# Patient Record
Sex: FEMALE | Race: WHITE | NOT HISPANIC OR LATINO | Employment: UNEMPLOYED | URBAN - METROPOLITAN AREA
[De-identification: names, ages, dates, MRNs, and addresses within clinical notes are randomized per-mention and may not be internally consistent; named-entity substitution may affect disease eponyms.]

---

## 2018-12-04 ENCOUNTER — OFFICE VISIT (OUTPATIENT)
Dept: FAMILY MEDICINE | Facility: CLINIC | Age: 18
End: 2018-12-04
Payer: COMMERCIAL

## 2018-12-04 VITALS
BODY MASS INDEX: 25.62 KG/M2 | HEIGHT: 70 IN | SYSTOLIC BLOOD PRESSURE: 107 MMHG | TEMPERATURE: 98.3 F | DIASTOLIC BLOOD PRESSURE: 68 MMHG | WEIGHT: 179 LBS | OXYGEN SATURATION: 98 % | HEART RATE: 64 BPM | RESPIRATION RATE: 16 BRPM

## 2018-12-04 DIAGNOSIS — K92.1 BLOOD IN STOOL: ICD-10-CM

## 2018-12-04 DIAGNOSIS — K64.4 EXTERNAL HEMORRHOIDS: Primary | ICD-10-CM

## 2018-12-04 DIAGNOSIS — L30.9 ECZEMA, UNSPECIFIED TYPE: ICD-10-CM

## 2018-12-04 DIAGNOSIS — K59.01 SLOW TRANSIT CONSTIPATION: ICD-10-CM

## 2018-12-04 RX ORDER — POLYETHYLENE GLYCOL 3350 17 G/17G
1 POWDER, FOR SOLUTION ORAL DAILY
Qty: 510 G | Refills: 1 | Status: SHIPPED | OUTPATIENT
Start: 2018-12-04 | End: 2019-05-07

## 2018-12-04 ASSESSMENT — ANXIETY QUESTIONNAIRES
5. BEING SO RESTLESS THAT IT IS HARD TO SIT STILL: NOT AT ALL
6. BECOMING EASILY ANNOYED OR IRRITABLE: MORE THAN HALF THE DAYS
GAD7 TOTAL SCORE: 6
2. NOT BEING ABLE TO STOP OR CONTROL WORRYING: SEVERAL DAYS
1. FEELING NERVOUS, ANXIOUS, OR ON EDGE: SEVERAL DAYS
IF YOU CHECKED OFF ANY PROBLEMS ON THIS QUESTIONNAIRE, HOW DIFFICULT HAVE THESE PROBLEMS MADE IT FOR YOU TO DO YOUR WORK, TAKE CARE OF THINGS AT HOME, OR GET ALONG WITH OTHER PEOPLE: SOMEWHAT DIFFICULT
3. WORRYING TOO MUCH ABOUT DIFFERENT THINGS: SEVERAL DAYS
7. FEELING AFRAID AS IF SOMETHING AWFUL MIGHT HAPPEN: NOT AT ALL

## 2018-12-04 ASSESSMENT — PATIENT HEALTH QUESTIONNAIRE - PHQ9: 5. POOR APPETITE OR OVEREATING: SEVERAL DAYS

## 2018-12-04 NOTE — PATIENT INSTRUCTIONS
Hemorrhoids    Hemorrhoids are swollen and inflamed veins inside the rectum and near the anus. The rectum is the last several inches of the colon. The anus is the passage between the rectum and the outside of the body.  Causes  The veins can become swollen due to increased pressure in them. This is most often caused by:    Chronic constipation or diarrhea    Straining when having a bowel movement    Sitting too long on the toilet    A low-fiber diet    Pregnancy  Symptoms    Bleeding from the rectum (this may be noticeable after bowel movements)    Lump near the anus    Itching around the anus    Pain around the anus  There are different types of hemorrhoids. Depending on the type you have and the severity, you may be able to treat yourself at home. In some cases, a procedure may be the best treatment option. Your healthcare provider can tell you more about this, if needed.  Home care  General care    To get relief from pain or itching, try:  ? Medicines. Your healthcare provider may recommend stool softeners, suppositories, or laxatives to help manage constipation. Use these exactly as directed.  ? Sitz baths. A sitz bath involves sitting in a few inches of warm bath water. Be careful not to make the water so hot that you burn yourself--test it before sitting in it. Soak for about 10 to 15 minutes a few times a day. This may help relieve pain.  ? Topical products. Your healthcare provider may prescribe or recommend creams, ointments, or pads that can be applied to the hemorrhoid. Use these exactly as directed.  Tips to help prevent hemorrhoids    Eat more fiber. Fiber adds bulk to stool and absorbs water as it moves through your colon. This makes stool softer and easier to pass.  ? Increase the fiber in your diet with more fiber-rich foods. These include fresh fruit, vegetables, and whole grains.  ? Take a fiber supplement or bulking agent, if advised by your healthcare provider. These include products such as  psyllium or methylcellulose.    Drink more water. Your healthcare provider may direct you to drink plenty of water. This can help keep stool soft.    Be more active. Frequent exercise aids digestion and helps prevent constipation. It may also help make bowel movements more regular.    Don t strain during bowel movements. This can make hemorrhoids more likely. Also, don t sit on the toilet for long periods of time.  Follow-up care  Follow up with your healthcare provider as advised. If a culture or imaging tests were done, someone will let you know the results when they are ready. This may take a few days or longer. If your healthcare provider recommends a procedure for your hemorrhoids, these options can be discussed. Options may include surgery and outpatient office treatments.  When to seek medical advice  Call your healthcare provider right away if any of these occur:    Increased bleeding from the rectum    Increased pain around the rectum or anus    Weakness or dizziness  Call 911  Call 911 if any of these occur:    Trouble breathing or swallowing    Fainting or loss of consciousness    Unusually fast heart rate    Vomiting blood    Large amounts of blood in stool or black, tarry stools  Date Last Reviewed: 9/1/2017 2000-2018 [employer]. [address] [place] [address], [place] [address]. All rights reserved. This information is not intended as a substitute for professional medical care. Always follow your healthcare professional's instructions.

## 2018-12-04 NOTE — MR AVS SNAPSHOT
After Visit Summary   12/4/2018    Winnie Eckert    MRN: [id number]           Patient Information     Date Of Birth          2000        Visit Information        Provider Department      12/4/2018 3:30 PM Theodora Barragan APRN [facility]        Today's Diagnoses     External hemorrhoids    -  1    Blood in stool        Slow transit constipation          Care Instructions      Hemorrhoids    Hemorrhoids are swollen and inflamed veins inside the rectum and near the anus. The rectum is the last several inches of the colon. The anus is the passage between the rectum and the outside of the body.  Causes  The veins can become swollen due to increased pressure in them. This is most often caused by:    Chronic constipation or diarrhea    Straining when having a bowel movement    Sitting too long on the toilet    A low-fiber diet    Pregnancy  Symptoms    Bleeding from the rectum (this may be noticeable after bowel movements)    Lump near the anus    Itching around the anus    Pain around the anus  There are different types of hemorrhoids. Depending on the type you have and the severity, you may be able to treat yourself at home. In some cases, a procedure may be the best treatment option. Your healthcare provider can tell you more about this, if needed.  Home care  General care    To get relief from pain or itching, try:  ? Medicines. Your healthcare provider may recommend stool softeners, suppositories, or laxatives to help manage constipation. Use these exactly as directed.  ? Sitz baths. A sitz bath involves sitting in a few inches of warm bath water. Be careful not to make the water so hot that you burn yourself--test it before sitting in it. Soak for about 10 to 15 minutes a few times a day. This may help relieve pain.  ? Topical products. Your healthcare provider may prescribe or recommend creams, ointments, or pads that can be applied to the hemorrhoid. Use these exactly as  directed.  Tips to help prevent hemorrhoids    Eat more fiber. Fiber adds bulk to stool and absorbs water as it moves through your colon. This makes stool softer and easier to pass.  ? Increase the fiber in your diet with more fiber-rich foods. These include fresh fruit, vegetables, and whole grains.  ? Take a fiber supplement or bulking agent, if advised by your healthcare provider. These include products such as psyllium or methylcellulose.    Drink more water. Your healthcare provider may direct you to drink plenty of water. This can help keep stool soft.    Be more active. Frequent exercise aids digestion and helps prevent constipation. It may also help make bowel movements more regular.    Don t strain during bowel movements. This can make hemorrhoids more likely. Also, don t sit on the toilet for long periods of time.  Follow-up care  Follow up with your healthcare provider as advised. If a culture or imaging tests were done, someone will let you know the results when they are ready. This may take a few days or longer. If your healthcare provider recommends a procedure for your hemorrhoids, these options can be discussed. Options may include surgery and outpatient office treatments.  When to seek medical advice  Call your healthcare provider right away if any of these occur:    Increased bleeding from the rectum    Increased pain around the rectum or anus    Weakness or dizziness  Call 911  Call 911 if any of these occur:    Trouble breathing or swallowing    Fainting or loss of consciousness    Unusually fast heart rate    Vomiting blood    Large amounts of blood in stool or black, tarry stools  Date Last Reviewed: 9/1/2017 2000-2018 [employer]. [address] [place] [address], [place] [address]. All rights reserved. This information is not intended as a substitute for professional medical care. Always follow your healthcare professional's instructions.                Follow-ups after your visit       "  Who to contact     Please call your clinic at [phone] to:    Ask questions about your health    Make or cancel appointments    Discuss your medicines    Learn about your test results    Speak to your doctor            Additional Information About Your Visit        MyChart Information     [employer] is an electronic gateway that provides easy, online access to your medical records. With [employer], you can request a clinic appointment, read your test results, renew a prescription or communicate with your care team.     To sign up for [employer] visit the website at [email or web address]   You will be asked to enter the access code listed below, as well as some personal information. Please follow the directions to create your username and password.     Your access code is: [email or web address]  Expires: 3/4/2019  4:11 PM     Your access code will  in 90 days. If you need help or a new code, please contact your [facility] or call [phone] for assistance.        Care EveryWhere ID     This is your Care EveryWhere ID. This could be used by other organizations to access your [facility] medical records  [id number]        Your Vitals Were     Pulse Temperature Respirations Height Last Period Pulse Oximetry    64 98.3  F (36.8  C) (Oral) 16 5' 10\" (177.8 cm) 2018 (Exact Date) 98%    BMI (Body Mass Index)                   25.68 kg/m2            Blood Pressure from Last 3 Encounters:   18 107/68    Weight from Last 3 Encounters:   18 179 lb (81.2 kg)              Today, you had the following     No orders found for display         Today's Medication Changes          These changes are accurate as of 18  4:11 PM.  If you have any questions, ask your nurse or doctor.               Start taking these medicines.        Dose/Directions    hydrocortisone 2.5 % cream   Commonly known as:  ANUSOL-HC   Used for:  External hemorrhoids   Started by:  Theodora Barragan APRN CNP    "     Place rectally 2 times daily as needed for hemorrhoids   Quantity:  30 g   Refills:  0       polyethylene glycol powder   Commonly known as:  MIRALAX   Used for:  Slow transit constipation   Started by:  Theodora Barragan APRN CNP        Dose:  1 capful   Take 17 g (1 capful) by mouth daily   Quantity:  510 g   Refills:  1            Where to get your medicines      These medications were sent to [facility] [id number] [facility] - [place], [place] - [address]  [address], [facility] [address]     Phone:  [phone]     hydrocortisone 2.5 % cream    polyethylene glycol powder                Primary Care Provider    None Specified       No primary provider on file.        Equal Access to Services     [facility]: Anastasia Muller, terry yañez, marcie mckeon, selvin mohamud . So [facility] [phone].    ATENCIÓN: Si habla español, tiene a hodge disposición servicios gratuitos de asistencia lingüística. Llame al [phone].    We comply with applicable federal civil rights laws and [place] laws. We do not discriminate on the basis of race, color, national origin, age, disability, sex, sexual orientation, or gender identity.            Thank you!     Thank you for choosing [facility]  for your care. Our goal is always to provide you with excellent care. Hearing back from our patients is one way we can continue to improve our services. Please take a few minutes to complete the written survey that you may receive in the mail after your visit with us. Thank you!             Your Updated Medication List - Protect others around you: Learn how to safely use, store and throw away your medicines at www.disposemymeds.org.          This list is accurate as of 12/4/18  4:11 PM.  Always use your most recent med list.                   Brand Name Dispense Instructions for use Diagnosis    hydrocortisone 2.5 % cream    ANUSOL-HC    30 g    Place rectally 2 times  daily as needed for hemorrhoids    External hemorrhoids       polyethylene glycol powder    MIRALAX    510 g    Take 17 g (1 capful) by mouth daily    Slow transit constipation       TRI-PREVIFEM PO      Take by mouth daily

## 2018-12-04 NOTE — PROGRESS NOTES
HPI       Winnie Eckert is a 18 year old who presents for     Chief Complaint   Patient presents with     Rectal Problem     Pt. presents to the clinic today with concerns of blood in her stool X 4 days. Hx of blood in stool before, just not this bad.      Winnie reports a 4 day history of bright red blood in her stool. She first noted the blood after a bowel movement 4 days ago, reports blood came both independently of the stool as well as streaked in the stool. Also noted blood and a small clot on the toilet paper after wiping. Reports pain both before, during, and after the bowel movement with pain eventually resolving post bowel movement. Winnie's next bowel movement was two days later when she noted blood again, however quantity was smaller. She admits to being constipated recently, having a bowel movement every other day. Has some straining with bowel movements, does not take stool softeners regularly. Denies diarrhea, abdominal pain, lightheadedness, dizziness, and chest pain. Hard stool make symptoms worse, she has found nothing to make symptoms better.     Winnie thinks she has felt a small protrusion from her rectum. Feels some pain and burning to the area. Denies itching. No known history of hemorrhoids or similar symptoms in the past. Admits to not drinking enough water on a daily basis and reports her diet could be improved to include more fiber. She denies regular NSAID use. She does not smoke or drink caffeine regularly.She drinks about 4 beers/week.     Of note, Winnie reports that the day prior to noticing the blood in her stool she had an episode of nausea without vomiting, mild abdominal discomfort, and decreased appetite all of which resolved spontaneously.     Winnie has never had GI studies completed in the past and she has never followed with GI specialist. She has a family history significant for colon cancer in her great maternal grandmother.     Eczema:  Winine reports increasing issues with her  skin, most recently molluscum contagiosum and eczema. She wonders if all these problems could be related. She is requesting a referral to dermatology to discuss her skin concerns.         Past Medical History:   Diagnosis Date     Eczema      Molluscum contagiosum      History reviewed. No pertinent surgical history.  Family History   Problem Relation Age of Onset     Diabetes Mother      Type 2     No Known Problems Father      No Known Problems Sister      No Known Problems Brother      No Known Problems Maternal Grandmother      Diabetes Maternal Grandfather      Type 2     No Known Problems Paternal Grandmother      No Known Problems Paternal Grandfather      Social History     Social History     Marital status: Single     Spouse name: N/A     Number of children: 0     Years of education: 14     Occupational History     Student      Social History Main Topics     Smoking status: Never Smoker     Smokeless tobacco: Never Used     Alcohol use 2.4 oz/week     4 Cans of beer per week     Drug use: Yes     Special: Marijuana      Comment: once a month     Sexual activity: Yes     Partners: Male     Birth control/ protection: Pill     Other Topics Concern     Not on file     Social History Narrative    Winnie is currently in her [place] year of college at the [employer], studying communications. She is in a monogamous relationship with her boyfriend.          Problem, Medication and Allergy Lists were reviewed and updated if needed..    Patient is a new patient to this clinic and so  I reviewed/updated the Past Medical History, the Family History and the Social History .         Review of Systems:   Review of Systems     ROS: 10 point ROS neg other than the symptoms noted above in the HPI.         Physical Exam:     Vitals:    12/04/18 1535   BP: 107/68   BP Location: Left arm   Patient Position: Chair   Cuff Size: Adult Regular   Pulse: 64   Resp: 16   Temp: 98.3  F (36.8  C)   TempSrc: Oral   SpO2: 98%   Weight: 179 lb  "(81.2 kg)   Height: 5' 10\" (177.8 cm)     Body mass index is 25.68 kg/(m^2).  Vitals were reviewed and were normal.     Physical Exam   Constitutional: She is oriented to person, place, and time. She appears well-developed and well-nourished. No distress.   Cardiovascular: Normal rate.    Pulmonary/Chest: Effort normal. No respiratory distress.   Genitourinary: Rectal exam shows external hemorrhoid.       Genitourinary Comments: Small flesh-colored protrusion at 5 o'clock position of rectum, size of pencil eraser. No blood or erythema noted, skin is intact.    Neurological: She is alert and oriented to person, place, and time.   Skin: Skin is warm and dry.   Psychiatric: She has a normal mood and affect. Her behavior is normal.       Results:     No laboratory testing was completed at today's visit.    Assessment and Plan      1. External hemorrhoids  2. Blood in stool  Comment: Pt with 4 day hx of blood in the stool, two separate episodes. On exam one small external hemorrhoid is noted, suspect this is the cause of blood in stool. Denies lightheadedness, dizziness, and chest pain. Pt complains of burning and pain, will prescribe topical steroid to manage this in addition to the below plan.   Plan:   - hydrocortisone (ANUSOL-HC) 2.5 % cream; Place rectally 2 times daily as needed for hemorrhoids  Dispense: 30 g; Refill: 0   - Counseled on appropriate medication administration   - Discussed reasons to return to clinic or seek emergency care    3. Slow transit constipation  Comment: Pt admits to being constipated recently, having a bowel movement every other day. Has some straining with bowel movements, does not take stool softeners regularly. Denies diarrhea and abdominal pain. Goal for one large soft formed bowel movement daily, advise daily Miralax until this goal is met, then can cut back to half dose or every other day use.   Plan:   - polyethylene glycol (MIRALAX) powder; Take 17 g (1 capful) by mouth daily  " Dispense: 510 g; Refill: 1   - Counseled patient on appropriate medication administration    4. Eczema, unspecified type  Comment: Winnie reports increasing issues with her skin, most recently molluscum contagiosum and eczema. She wonders if all these problems could be related. She is requesting a referral to dermatology to discuss her skin concerns.    Plan:   - DERMATOLOGY REFERRAL      Follow-up: Return to clinic if symptoms fail to improve, worsen, or new symptoms develop with the above treatment plan.        There are no discontinued medications.    Options for treatment and follow-up care were reviewed with the patient. Winnie Eckert  engaged in the decision making process and verbalized understanding of the options discussed and agreed with the final plan.    MAYRA Amin CNP

## 2018-12-04 NOTE — NURSING NOTE
"18 year old  Chief Complaint   Patient presents with     Rectal Problem     Pt. presents to the clinic today with concerns of blood in her stool X 4 days. Hx of blood in stool before, just not this bad.        Blood pressure 107/68, pulse 64, temperature 98.3  F (36.8  C), temperature source Oral, resp. rate 16, height 5' 10\" (177.8 cm), weight 179 lb (81.2 kg), last menstrual period 11/14/2018, SpO2 98 %. Body mass index is 25.68 kg/(m^2).  BP completed using cuff size:    There is no problem list on file for this patient.      Wt Readings from Last 2 Encounters:   12/04/18 179 lb (81.2 kg)     BP Readings from Last 3 Encounters:   12/04/18 107/68       No Known Allergies    Current Outpatient Prescriptions   Medication     Norgestim-Eth Estrad Triphasic (TRI-PREVIFEM PO)     No current facility-administered medications for this visit.        Social History   Substance Use Topics     Smoking status: Never Smoker     Smokeless tobacco: Never Used     Alcohol use 2.4 oz/week     4 Cans of beer per week         Honoring Choices - Health Care Directive Guide offered to patient at time of visit.    Health Maintenance Due   Topic Date Due     CHLAMYDIA SCREENING  2000     PEDS DTAP/TDAP (1 - Tdap) 05/17/2005     HPV IMMUNIZATION (1 of 3 - Female 3 Dose Series) 05/17/2009     PHQ-2 Q1 YR  05/17/2010     TETANUS IMMUNIZATION (SYSTEM ASSIGNED)  05/17/2016     HIV SCREEN (SYSTEM ASSIGNED)  05/17/2016     INFLUENZA VACCINE (1) 09/01/2018         There is no immunization history on file for this patient.    No results found for: PAP      No lab results found.    PHQ-2 ( 1999 Pfizer) 12/4/2018   Q1: Little interest or pleasure in doing things 0   Q2: Feeling down, depressed or hopeless 0   PHQ-2 Score 0       No flowsheet data found.    DILLAN-7 SCORE 12/4/2018   Total Score 6       No flowsheet data found.    Geraldine Monahan CMA  December 4, 2018 3:39 PM  "

## 2018-12-05 ASSESSMENT — ANXIETY QUESTIONNAIRES: GAD7 TOTAL SCORE: 6

## 2019-01-23 ENCOUNTER — OFFICE VISIT (OUTPATIENT)
Dept: FAMILY MEDICINE | Facility: CLINIC | Age: 19
End: 2019-01-23
Payer: COMMERCIAL

## 2019-01-23 VITALS
RESPIRATION RATE: 16 BRPM | OXYGEN SATURATION: 97 % | SYSTOLIC BLOOD PRESSURE: 108 MMHG | HEIGHT: 70 IN | TEMPERATURE: 98.6 F | HEART RATE: 96 BPM | BODY MASS INDEX: 25.44 KG/M2 | DIASTOLIC BLOOD PRESSURE: 74 MMHG | WEIGHT: 177.7 LBS

## 2019-01-23 DIAGNOSIS — J03.90 TONSILLITIS: ICD-10-CM

## 2019-01-23 DIAGNOSIS — R07.0 THROAT PAIN: ICD-10-CM

## 2019-01-23 DIAGNOSIS — R68.89 FLU-LIKE SYMPTOMS: Primary | ICD-10-CM

## 2019-01-23 LAB
FLUAV AG UPPER RESP QL IA.RAPID: NEGATIVE
FLUBV AG UPPER RESP QL IA.RAPID: NEGATIVE
S PYO AG THROAT QL IA.RAPID: NEGATIVE

## 2019-01-23 RX ORDER — AMOXICILLIN 500 MG/1
500 TABLET, FILM COATED ORAL 2 TIMES DAILY
Qty: 20 TABLET | Refills: 0 | Status: SHIPPED | OUTPATIENT
Start: 2019-01-23 | End: 2019-05-07

## 2019-01-23 RX ORDER — PSEUDOEPHEDRINE HCL 30 MG
30 TABLET ORAL EVERY 4 HOURS PRN
Qty: 20 TABLET | Refills: 0 | Status: SHIPPED | OUTPATIENT
Start: 2019-01-23 | End: 2019-05-07

## 2019-01-23 ASSESSMENT — MIFFLIN-ST. JEOR: SCORE: 1653.12

## 2019-01-23 NOTE — NURSING NOTE
"18 year old  Chief Complaint   Patient presents with     Throat Pain     Plugged ears, diarrhea, nausea, no appetite, headaches, x4 days, bodyaches, getting worse, Fever, using Theraflu, taking tylenol       Blood pressure 108/74, pulse 96, temperature 98.6  F (37  C), temperature source Oral, resp. rate 16, height 1.773 m (5' 9.8\"), weight 80.6 kg (177 lb 11.2 oz), SpO2 97 %. Body mass index is 25.64 kg/m .  BP completed using cuff size:    There is no problem list on file for this patient.      Wt Readings from Last 2 Encounters:   01/23/19 80.6 kg (177 lb 11.2 oz)   12/04/18 81.2 kg (179 lb)     BP Readings from Last 3 Encounters:   01/23/19 108/74   12/04/18 107/68       No Known Allergies    Current Outpatient Medications   Medication     polyethylene glycol (MIRALAX) powder     hydrocortisone (ANUSOL-HC) 2.5 % cream     Norgestim-Eth Estrad Triphasic (TRI-PREVIFEM PO)     No current facility-administered medications for this visit.        Social History     Tobacco Use     Smoking status: Never Smoker     Smokeless tobacco: Never Used   Substance Use Topics     Alcohol use: Yes     Alcohol/week: 2.4 oz     Types: 4 Cans of beer per week     Drug use: Yes     Types: Marijuana     Comment: once a month       Honoring Choices - Health Care Directive Guide offered to patient at time of visit.    Health Maintenance Due   Topic Date Due     CHLAMYDIA SCREENING  2000     DTAP/TDAP/TD IMMUNIZATION (1 - Tdap) 05/17/2005     HPV IMMUNIZATION (1 - Female 3-dose series) 05/17/2009     MENINGITIS IMMUNIZATION (1 - 2-dose series) 05/17/2014     HIV SCREEN (SYSTEM ASSIGNED)  05/17/2016     INFLUENZA VACCINE (1) 09/01/2018         There is no immunization history on file for this patient.    No results found for: PAP      No lab results found.    PHQ-2 ( 1999 Pfizer) 12/4/2018   Q1: Little interest or pleasure in doing things 0   Q2: Feeling down, depressed or hopeless 0   PHQ-2 Score 0       No flowsheet data " found.    DILLAN-7 SCORE 12/4/2018   Total Score 6       No flowsheet data found.      Sera Elias CMA  January 23, 2019 9:35 AM

## 2019-01-23 NOTE — PATIENT INSTRUCTIONS
Patient Education     Pharyngitis: Strep (Presumed)    You have pharyngitis (sore throat). The healthcare staff think your sore throat is caused by streptococcus (strep) bacteria. This is often called strep throat. Strep throat can cause throat pain that is worse when swallowing, aching all over, headache, and fever. The infection is contagious. It may be spread by coughing, kissing, or touching others after touching your mouth or nose. Antibiotic medicine is given to treat the infection.  Home care    Rest at home. Drink plenty of fluids so you won t get dehydrated.    Stay home from work or school for the first 2 days of taking the antibiotics. After this time, you will not be contagious. You can then return to work or school if you are feeling better.     Take the antibiotic medicine for the full 10 days, even when you feel better. This is very important to make sure the infection is fully treated. It is also important to prevent medicine-resistant germs from growing. If you were given an antibiotic shot, no more antibiotics are needed.    You may use acetaminophen or ibuprofen to control pain or fever, unless another medicine was prescribed for this. If you have chronic liver or kidney disease or ever had a stomach ulcer or GI bleeding, talk with your healthcare provider before using these medicines.    Use throat lozenges or a throat-numbing spray to help reduce throat pain. Gargling with warm salt water can also help reduce throat pain. Dissolve 1/2 teaspoon of salt in 1 glass of warm water.     Don t eat salty or spicy foods. These can irritate the throat.  Follow-up care  Follow up with your healthcare provider or our staff if you don't get better over the next week.  When to seek medical advice  Call your healthcare provider right away if any of these occur:    Fever as directed by your healthcare provider    New or worse ear pain, sinus pain, or headache    Painful lumps in the back of neck    Stiff  neck    Lymph nodes that get larger    Can t swallow liquids, a lot of drooling, or can t open mouth wide due to throat pain    Signs of dehydration, such as very dark urine or no urine, sunken eyes, dizziness    Trouble breathing or noisy breathing    Muffled voice    New rash  Prevention  Here are steps you can take to help prevent an infection:    Keep good hand washing habits.    Don t have close contact with people who have sore throats, colds, or other upper respiratory infections.    Don t smoke, and stay away from secondhand smoke.    Stay up to date with of your vaccines.  Date Last Reviewed: 11/1/2017 2000-2018 [employer] [facility]. [address] [place] [address], [place] [address]. All rights reserved. This information is not intended as a substitute for professional medical care. Always follow your healthcare professional's instructions.

## 2019-01-23 NOTE — PROGRESS NOTES
"       HPI       Winnie Eckert is a 18 year old who presents for     Chief Complaint   Patient presents with     Throat Pain     Plugged ears, diarrhea, nausea, no appetite, headaches, x4 days, bodyaches, getting worse, Fever, using Theraflu, taking tylenol     Winnie reports a four day history of fever, body aches, nausea, diarrhea, sore throat, and ear pain. Last evening her fever was 103.1. Winnie reports her ears are painful, feels increased pressure. She has noted a thin warm drainage coming from both ears. Patient tried to eat some soup this morning as she felt hungry, however this caused her to feel nauseas. She has not vomited. Reports diarrhea x 2. Winnie began taking Theraflu last evening, which seemed to be effective in managing her symptoms. She has also tried Tylenol. Winnie reports no known exposure to illnesses.     Yesterday was the first day of classes at her [employer], which she attended despite feeling ill. She is requesting a note excusing her absence from classes today.       Past Medical History:   Diagnosis Date     Eczema      Molluscum contagiosum      Current Outpatient Medications   Medication     amoxicillin (AMOXIL) 500 MG tablet     polyethylene glycol (MIRALAX) powder     pseudoePHEDrine (SUDAFED) 30 MG tablet     hydrocortisone (ANUSOL-HC) 2.5 % cream     Norgestim-Eth Estrad Triphasic (TRI-PREVIFEM PO)     No current facility-administered medications for this visit.       No Known Allergies      Problem, Medication and Allergy Lists were reviewed and updated if needed..    Patient is an established patient of this clinic..         Review of Systems:   Review of Systems     ROS: 10 point ROS neg other than the symptoms noted above in the HPI.         Physical Exam:     Vitals:    01/23/19 0934   BP: 108/74   Pulse: 96   Resp: 16   Temp: 98.6  F (37  C)   TempSrc: Oral   SpO2: 97%   Weight: 80.6 kg (177 lb 11.2 oz)   Height: 1.773 m (5' 9.8\")     Body mass index is 25.64 kg/m .  Vitals were " reviewed and were normal.     Physical Exam   Constitutional: She is oriented to person, place, and time. She appears well-developed and well-nourished. No distress.   HENT:   Right Ear: Tympanic membrane normal. No drainage. Tympanic membrane is not erythematous, not retracted and not bulging. No middle ear effusion.   Left Ear: No drainage. Tympanic membrane is retracted. Tympanic membrane is not erythematous and not bulging.  No middle ear effusion.   Mouth/Throat: Uvula is midline and mucous membranes are normal. Posterior oropharyngeal erythema present. No oropharyngeal exudate or posterior oropharyngeal edema. Tonsils are 1+ on the right. Tonsils are 2+ on the left. Tonsillar exudate.   Right TM with cerumen present only allowing for partial visualization of TM.    Eyes: Right eye exhibits no discharge. Left eye exhibits no discharge.   Neck: Neck supple.   Cardiovascular: Normal rate, regular rhythm and normal heart sounds. Exam reveals no gallop and no friction rub.   No murmur heard.  Pulmonary/Chest: Effort normal and breath sounds normal. No stridor. No respiratory distress. She has no wheezes. She has no rales.   Lymphadenopathy:     She has no cervical adenopathy.   Neurological: She is alert and oriented to person, place, and time.   Skin: Skin is warm and dry.   Psychiatric: She has a normal mood and affect. Her behavior is normal.       Results:     Results for orders placed or performed in visit on 01/23/19   Strep Screen Rapid (Group) ([facility])   Result Value Ref Range    Rapid Strep A Screen NEGATIVE Negative   Influenza A/B Antigen ([facility]) (LabDAQ)   Result Value Ref Range    Influenza A NEGATIVE Negative    Influenza B NEGATIVE Negative     Laboratory testing pending:  Strep Culture    Assessment and Plan      1. Flu-like symptoms  Comment: Pt with 4 day hx of flu-like symptoms including fever (103.1), body aches, nausea, diarrhea, sore throat, and ear pain with drainage.  Rapid influenza swab today is negative. Will try an oral decongestant for the ear pain. On exam, TMs were intact bilaterally, so reported ear drainage is likely cerumen rather than perforation.  Plan:   - Influenza A/B Antigen ([facility]) ([facility]DAQ)   - pseudoePHEDrine (SUDAFED) 30 MG tablet; Take 1 tablet (30 mg) by mouth every 4 hours as needed for congestion  Dispense: 20 tablet; Refill: 0   - Supportive cares including fluids, rest, ibuprofen/tylenol for pain or fever   - Counseled on reasons to return to clinic    - Given letter excusing absence from classes today and tomorrow    2. Throat pain  3. Tonsillitis  Comment: Rapid strep test today is negative, throat culture is pending. On exam, bilateral tonsils are with exudate and posterior pharynx is erythematous. Considering exam findings, will prescribe antibiotic if symptoms are not improving over the next 24 hours.   Plan:   - Strep Screen Rapid (Group) ([facility])   - Strep Culture (GABS)   - amoxicillin (AMOXIL) 500 MG tablet; Take 1 tablet (500 mg) by mouth 2 times daily for 10 days  Dispense: 20 tablet; Refill: 0   - Discussed possible side effects of antibiotic and ways to minimize   - Counseled on s/sx of peritonsillar abscess and reasons to return to clinic    Follow-up: Return to clinic if symptoms fail to improve, worsen, or new symptoms develop with the above treatment plan.        There are no discontinued medications.    Options for treatment and follow-up care were reviewed with the patient. Winnie Eckert  engaged in the decision making process and verbalized understanding of the options discussed and agreed with the final plan.    MAYRA Amin CNP

## 2019-01-23 NOTE — LETTER
[facility] [employer]  [address] [address]  [phone]          January 23, 2019    RE:  Winnie Eckert                                                                                                                                                       [address] [facility] [place] [address]            To whom it may concern:    Winnie Eckert is under my professional care for management of flu-like symptoms with fever. She is formally excused from all classes Wednesday January 23, 2019 through Thursday January 24, 2019.       Please contact our clinic with any questions or concerns.       Sincerely,          Theodora Barragan, DNP, APRN, CNP

## 2019-01-25 LAB
BACTERIA SPEC CULT: NORMAL
Lab: NORMAL
SPECIMEN SOURCE: NORMAL

## 2019-05-07 ENCOUNTER — OFFICE VISIT (OUTPATIENT)
Dept: FAMILY MEDICINE | Facility: CLINIC | Age: 19
End: 2019-05-07
Payer: COMMERCIAL

## 2019-05-07 VITALS
SYSTOLIC BLOOD PRESSURE: 98 MMHG | HEIGHT: 70 IN | HEART RATE: 74 BPM | RESPIRATION RATE: 16 BRPM | DIASTOLIC BLOOD PRESSURE: 65 MMHG | OXYGEN SATURATION: 98 % | BODY MASS INDEX: 25.48 KG/M2 | WEIGHT: 178 LBS | TEMPERATURE: 99.1 F

## 2019-05-07 DIAGNOSIS — N89.8 VAGINAL DISCHARGE: ICD-10-CM

## 2019-05-07 DIAGNOSIS — Z11.3 ROUTINE SCREENING FOR STI (SEXUALLY TRANSMITTED INFECTION): ICD-10-CM

## 2019-05-07 DIAGNOSIS — Z30.41 ENCOUNTER FOR SURVEILLANCE OF CONTRACEPTIVE PILLS: Primary | ICD-10-CM

## 2019-05-07 LAB
CLUE CELLS: NORMAL
HCG UR QL: NEGATIVE
MOTILE TRICHOMONAS: NEGATIVE
YEAST: NORMAL

## 2019-05-07 RX ORDER — NORGESTIMATE AND ETHINYL ESTRADIOL 7DAYSX3 28
1 KIT ORAL DAILY
Qty: 84 TABLET | Refills: 3 | Status: SHIPPED | OUTPATIENT
Start: 2019-05-07 | End: 2019-05-07

## 2019-05-07 RX ORDER — NORGESTIMATE AND ETHINYL ESTRADIOL 7DAYSX3 28
1 KIT ORAL DAILY
Qty: 84 TABLET | Refills: 3 | Status: SHIPPED | OUTPATIENT
Start: 2019-05-07 | End: 2019-08-05

## 2019-05-07 ASSESSMENT — ANXIETY QUESTIONNAIRES
3. WORRYING TOO MUCH ABOUT DIFFERENT THINGS: SEVERAL DAYS
6. BECOMING EASILY ANNOYED OR IRRITABLE: NOT AT ALL
GAD7 TOTAL SCORE: 2
IF YOU CHECKED OFF ANY PROBLEMS ON THIS QUESTIONNAIRE, HOW DIFFICULT HAVE THESE PROBLEMS MADE IT FOR YOU TO DO YOUR WORK, TAKE CARE OF THINGS AT HOME, OR GET ALONG WITH OTHER PEOPLE: SOMEWHAT DIFFICULT
5. BEING SO RESTLESS THAT IT IS HARD TO SIT STILL: NOT AT ALL
2. NOT BEING ABLE TO STOP OR CONTROL WORRYING: NOT AT ALL
1. FEELING NERVOUS, ANXIOUS, OR ON EDGE: NOT AT ALL
7. FEELING AFRAID AS IF SOMETHING AWFUL MIGHT HAPPEN: NOT AT ALL

## 2019-05-07 ASSESSMENT — PATIENT HEALTH QUESTIONNAIRE - PHQ9
SUM OF ALL RESPONSES TO PHQ QUESTIONS 1-9: 1
5. POOR APPETITE OR OVEREATING: SEVERAL DAYS

## 2019-05-07 ASSESSMENT — MIFFLIN-ST. JEOR: SCORE: 1657.65

## 2019-05-07 NOTE — PROGRESS NOTES
"       HPI       Winnie Eckert is a 18 year old with a past medical history significant for molluscum contagiosum and eczema who presents for     Chief Complaint   Patient presents with     Recheck Medication     Pt. presents to the clinic today for a medication follow up.      Oral contraceptive restart request:   Winnie reports wanting to restart her oral contraceptive. She has been on triphasic combined oral contraceptive since  but recently stopped the birth control 3 months ago secondary to insurance changes. She reports tolerating oral birth control well in the past. Reports initially starting the oral birth control for management of menstrual cramping and acne. States that she reliably takes the medication within a 4 hour window each day.     Winnie denies a family or personal history of breast cancer or breast lumps. She denies a family or personal history of blood clots. She denies history of migraine with aura. Winnie is currently a non-smoker but admits to having started smoking cigarettes and vaping in 2019. She has since quit both smoking cigarettes and vaping.      Vaginal discharge:  Winnie reports feeling as though her vaginal environment is \"off.\" She reports an increase in volume of white vaginal discharge and feels like the consistency has become thinner. She also thinks the smell has changed along with mild itching and pain with intercourse. Denies fever, chills, pelvic pain or urinary symptoms such as increase in frequency, urgency, hematuria, or pain with urination. Winnie reports she is not currently in a monogamous relationship but has had two new male sexual partners since her last STI screening.     Gyn History: , LMP 19. Reports cycle lasts 28 days with 3-5 days of moderate bleeding. Denies concerns of excessive bleeding or cramping.     Derm referral: Winnie is requesting the number for the dermatolgy clinic that she was referred to in 2018 for a history of \"weird skin " "issues\" since she was a baby. Most recently, she has noted small bumps surrounding her scalp. Reports a history of molluscum contagiosum and eczema.     Problem, Medication and Allergy Lists were reviewed and updated. This is an established patient of the clinic.     Past Medical History:   Diagnosis Date     Eczema      Molluscum contagiosum      Current Outpatient Medications   Medication     norgestim-eth estrad triphasic (TRI-PREVIFEM) 0.18/0.215/0.25 MG-35 MCG tablet     No current facility-administered medications for this visit.       No Known Allergies           Review of Systems:     10-point ROS is negative unless otherwise indicated in HPI.          Physical Exam:     Vitals:    05/07/19 1000   BP: 98/65   BP Location: Left arm   Patient Position: Chair   Cuff Size: Adult Regular   Pulse: 74   Resp: 16   Temp: 99.1  F (37.3  C)   TempSrc: Oral   SpO2: 98%   Weight: 80.7 kg (178 lb)   Height: 1.778 m (5' 10\")     Body mass index is 25.54 kg/m .  Vitals were reviewed and were normal.     Physical Exam   Constitutional: She is oriented to person, place, and time. She appears well-developed and well-nourished. No distress.   Cardiovascular: Normal rate.   Pulmonary/Chest: Effort normal. No respiratory distress.   Genitourinary:   Genitourinary Comments: Patient deferred  exam, opted for self swab collection for G/C and wet prep.    Neurological: She is alert and oriented to person, place, and time.   Psychiatric: She has a normal mood and affect. Her behavior is normal.       Results:      Results from this visit  Results for orders placed or performed in visit on 05/07/19   Wet Prep ([facility])   Result Value Ref Range    Yeast Wet Prep None none    Motile Trichomonas Wet Prep Negative Negative    Clue Cells Wet Prep None NONE   HCG Qualitative Urine (UPT) ([facility])   Result Value Ref Range    HCG Qual Urine NEGATIVE Negative     Laboratory testing pending:  Neisseria Gonorrhoea " "PCR  Chlamydia Trachomatis PCR    Assessment and Plan      1. Encounter for surveillance of contraceptive pills  Comment: Pt requesting to re-start her oral combined contraceptive. Has been off oral birth control for the past 3 months secondary to insurance coverage. She has no identifiable contraindications to oral birth control today.   Plan:   - norgestim-eth estrad triphasic (TRI-PREVIFEM) 0.18/0.215/0.25 MG-35 MCG tablet; Take 1 tablet by mouth daily  Dispense: 84 tablet; Refill: 3   - HCG Qualitative Urine (UPT) ([facility])- NEGATIVE    - Counseled on how to take the above medication, importance of adherence   - Encouraged continue strict condom use    2. Routine screening for STI (sexually transmitted infection)  Comment: Pt denies known exposure. Reports two sexual partners since last STI screen. Reports strict adherence with condoms.   Plan:   - NEISSERIA GONORRHOEA PCR   - CHLAMYDIA TRACHOMATIS PCR   - Reinforced importance of condom use in preventing STIs    3. Vaginal discharge  Comment: Pt reports increased vaginal discharge, mild itching, and odor. Wet prep today is negative for trich, yeast, and BV. Pt reports symptoms are not terribly bothersome to her at this time. Patient's description of vaginal discharge is consistent with normal physiologic leukorrhea.   Plan:   - Wet Prep ([facility])-unremarkable    - NEISSERIA GONORRHOEA PCR   - CHLAMYDIA TRACHOMATIS PCR   - Discussed variations of \"normal discharge\"    - Counseled on reasons to return to clinic    Follow-up: Lab results pending, will follow-up as indicated. Return to clinic if symptoms fail to improve, worsen, or new symptoms develop with the above treatment plan.        Medications Discontinued During This Encounter   Medication Reason     Norgestim-Eth Estrad Triphasic (TRI-PREVIFEM PO) Reorder     norgestim-eth estrad triphasic (TRI-PREVIFEM) 0.18/0.215/0.25 MG-35 MCG tablet      amoxicillin (AMOXIL) 500 MG tablet Therapy " completed     polyethylene glycol (MIRALAX) powder Therapy completed     hydrocortisone (ANUSOL-HC) 2.5 % cream Therapy completed     pseudoePHEDrine (SUDAFED) 30 MG tablet Therapy completed       Options for treatment and follow-up care were reviewed with the patient. Winnie Eckert  engaged in the decision making process and verbalized understanding of the options discussed and agreed with the final plan.    Tomasa Aguilar RN, FNP Student [facility]    I was present with the NPP student who participated in the service and in the documentation of the services provided. I have verified the history and personally performed the physical exam and medical decision making, as documented by the student and edited by me.    MAYRA Amin CNP  05/08/19  10:33 AM        MAYRA Amin CNP

## 2019-05-07 NOTE — NURSING NOTE
"18 year old  Chief Complaint   Patient presents with     Recheck Medication     Pt. presents to the clinic today for a medication follow up.        Blood pressure 98/65, pulse 74, temperature 99.1  F (37.3  C), temperature source Oral, resp. rate 16, height 1.778 m (5' 10\"), weight 80.7 kg (178 lb), last menstrual period 04/14/2019, SpO2 98 %. Body mass index is 25.54 kg/m .  BP completed using cuff size:    There is no problem list on file for this patient.      Wt Readings from Last 2 Encounters:   05/07/19 80.7 kg (178 lb)   01/23/19 80.6 kg (177 lb 11.2 oz)     BP Readings from Last 3 Encounters:   05/07/19 98/65   01/23/19 108/74   12/04/18 107/68       No Known Allergies    Current Outpatient Medications   Medication     hydrocortisone (ANUSOL-HC) 2.5 % cream     Norgestim-Eth Estrad Triphasic (TRI-PREVIFEM PO)     polyethylene glycol (MIRALAX) powder     No current facility-administered medications for this visit.        Social History     Tobacco Use     Smoking status: Never Smoker     Smokeless tobacco: Never Used   Substance Use Topics     Alcohol use: Yes     Alcohol/week: 2.4 oz     Types: 4 Cans of beer per week     Drug use: Yes     Types: Marijuana     Comment: once a month       Honoring Choices - Health Care Directive Guide offered to patient at time of visit.    Health Maintenance Due   Topic Date Due     PREVENTIVE CARE VISIT  1998     CHLAMYDIA SCREENING  1998     DTAP/TDAP/TD IMMUNIZATION (1 - Tdap) 05/17/2005     HPV IMMUNIZATION (1 - Female 3-dose series) 05/17/2013     HIV SCREEN (SYSTEM ASSIGNED)  05/17/2016     PHQ-2  01/01/2019         There is no immunization history on file for this patient.    No results found for: PAP      No lab results found.    PHQ-2 ( 1999 Pf[employer]) 5/7/2019 12/4/2018   Q1: Little interest or pleasure in doing things 0 0   Q2: Feeling down, depressed or hopeless 0 0   PHQ-2 Score 0 0       PHQ-9 SCORE 5/7/2019   PHQ-9 Total Score 1       DILLAN-7 SCORE " 12/4/2018 5/7/2019   Total Score 6 2       No flowsheet data found.    Geraldine Monahan CMA  May 7, 2019 10:01 AM

## 2019-05-07 NOTE — PATIENT INSTRUCTIONS
1) Prescription for birth control sent to your pharmacy, I gave you enough to last 1 year.   2) Will release lab results to [facility] (gonorrhoea/chlamydia/pregnancy test).

## 2019-05-07 NOTE — LETTER
May 8, 2019      Winnie Eckert  [address] [facility] [address]        Dear Ms.Von Oneill,    We are writing to inform you of your test results.    Your test results fall within the expected range(s) or remain unchanged from previous results.  Please continue with current treatment plan.    Resulted Orders   Wet Prep ([facility])   Result Value Ref Range    Yeast Wet Prep None none    Motile Trichomonas Wet Prep Negative Negative    Clue Cells Wet Prep None NONE   NEISSERIA GONORRHOEA PCR   Result Value Ref Range    Specimen Descrip Urine     N Gonorrhea PCR Negative NEG^Negative      Comment:      Negative for N. gonorrhoeae rRNA by transcription mediated amplification.  A negative result by transcription mediated amplification does not preclude   the presence of N. gonorrhoeae infection because results are dependent on   proper and adequate collection, absence of inhibitors, and sufficient rRNA to   be detected.     CHLAMYDIA TRACHOMATIS PCR   Result Value Ref Range    Specimen Description Urine     Chlamydia Trachomatis PCR Negative NEG^Negative      Comment:      Negative for C. trachomatis rRNA by transcription mediated amplification.  A negative result by transcription mediated amplification does not preclude   the presence of C. trachomatis infection because results are dependent on   proper and adequate collection, absence of inhibitors, and sufficient rRNA to   be detected.     HCG Qualitative Urine (UPT) ([facility])   Result Value Ref Range    HCG Qual Urine NEGATIVE Negative       If you have any questions or concerns, please call the clinic at the number listed above.       Sincerely,        MAYRA Amin CNP

## 2019-05-08 LAB
C TRACH DNA SPEC QL NAA+PROBE: NEGATIVE
N GONORRHOEA DNA SPEC QL NAA+PROBE: NEGATIVE
SPECIMEN SOURCE: NORMAL
SPECIMEN SOURCE: NORMAL

## 2019-05-08 ASSESSMENT — ANXIETY QUESTIONNAIRES: GAD7 TOTAL SCORE: 2

## 2019-06-17 ENCOUNTER — OFFICE VISIT (OUTPATIENT)
Dept: FAMILY MEDICINE | Facility: CLINIC | Age: 19
End: 2019-06-17
Payer: COMMERCIAL

## 2019-06-17 VITALS
HEART RATE: 60 BPM | WEIGHT: 175 LBS | RESPIRATION RATE: 16 BRPM | TEMPERATURE: 98 F | OXYGEN SATURATION: 99 % | DIASTOLIC BLOOD PRESSURE: 68 MMHG | HEIGHT: 70 IN | BODY MASS INDEX: 25.05 KG/M2 | SYSTOLIC BLOOD PRESSURE: 100 MMHG

## 2019-06-17 DIAGNOSIS — R30.0 DYSURIA: Primary | ICD-10-CM

## 2019-06-17 LAB
BILIRUBIN UR: NEGATIVE MG/DL
BLOOD UR: NORMAL MG/DL
CLARITY, URINE: CLEAR
COLOR UR: YELLOW
GLUCOSE URINE: NEGATIVE MG/DL
KETONES UR QL: NEGATIVE MG/DL
LEUKOCYTE ESTERASE UR: NORMAL U/L
NITRITE UR QL STRIP: NEGATIVE MG/DL
PH UR STRIP: 6.5 [PH] (ref 4.5–8)
PROTEIN UR: NORMAL MG/DL
SP GR UR STRIP: 1.01 (ref 1–1.03)
UROBILINOGEN UR STRIP-ACNC: NORMAL E.U./DL

## 2019-06-17 ASSESSMENT — MIFFLIN-ST. JEOR: SCORE: 1639.04

## 2019-06-17 NOTE — PROGRESS NOTES
"       HPI       Winnie Young is a 19 year old female with a past medical history significant for molluscum contagiosum and eczema who presents for     Chief Complaint   Patient presents with     Dysuria     Pt. presents to the clinic today with painful urination X 2 weeks.      Winnie reports a two week history of dysuria, urinary frequency, urinary urgency, urinary hesitancy, and hematuria. She denies fevers, chills, flank pain, nausea, vomiting, abdominal pain, and pelvic pain. Reports dyspareunia during last sexual intercourse but it was mild. She denies any new sexual partners. Winnie reports history of UTI, usually once annually. She denies history of kidney stones. Winnie is currently on oral contraceptive and reports taking the medication as prescribed with strict compliance. Winnie has not yet tried any medication or therapies to manage her urinary symptoms.     Past Medical History:   Diagnosis Date     Eczema      Molluscum contagiosum      Current Outpatient Medications   Medication     norgestim-eth estrad triphasic (TRI-PREVIFEM) 0.18/0.215/0.25 MG-35 MCG tablet     nitroFURantoin macrocrystal-monohydrate (MACROBID) 100 MG capsule     No current facility-administered medications for this visit.       No Known Allergies      Problem, Medication and Allergy Lists were reviewed and updated if needed..    Patient is an established patient of this clinic..         Review of Systems:   Review of Systems     ROS: 10 point ROS neg other than the symptoms noted above in the HPI.         Physical Exam:     Vitals:    06/17/19 1138   BP: 100/68   BP Location: Left arm   Patient Position: Chair   Cuff Size: Adult Regular   Pulse: 60   Resp: 16   Temp: 98  F (36.7  C)   TempSrc: Oral   SpO2: 99%   Weight: 79.4 kg (175 lb)   Height: 1.778 m (5' 10\")     Body mass index is 25.11 kg/m .  Vitals were reviewed and were normal.     Physical Exam   Constitutional: She is oriented to person, place, and time. She appears " well-developed and well-nourished. No distress.   Cardiovascular: Normal rate, regular rhythm and normal heart sounds. Exam reveals no gallop and no friction rub.   No murmur heard.  Pulmonary/Chest: Effort normal and breath sounds normal. No stridor. No respiratory distress. She has no wheezes. She has no rales.   Abdominal: Soft. Bowel sounds are normal. She exhibits no distension and no mass. There is no tenderness. There is CVA tenderness.   Mild CVA tenderness palpated bilaterally which patient attributes to recent exercise.    Neurological: She is alert and oriented to person, place, and time.   Skin: Skin is warm and dry.   Psychiatric: She has a normal mood and affect. Her behavior is normal.         Results:     Results for orders placed or performed in visit on 06/17/19   Urinalysis, Micro If ([facility])   Result Value Ref Range    Leukocyte Esterase UR 3+ Negative U/l    Nitrite Urine Negative Negative mg/dL    Protein UR 1+ Negative mg/dL    Glucose Urine Negative Negative mg/dL    Ketones Urine Negative Negative mg/dL    Urobilinogen mg/dL 0.2 E.U./dL 0.2 E.U./dL    Bilirubin UR Negative Negative mg/dL    Blood UR 2+ Negative mg/dL    Specific Gravity Urine 1.015 1.005 - 1.030    pH Urine 6.5 4.5 - 8.0    Color Urine Yellow Yellow    Clarity, urine Clear Clear       Laboratory testing pending:   Urine Culture Aerobic Bacterial    Assessment and Plan      1. Dysuria  Comment: Pt with 2 week hx of urinary symptoms. She is in a monogamous relationship, most recent STI screening 5/7/19, all results negative. Pt is compliant with oral birth control. Urinalysis today reveals leukocytes 3+ and blood 2+ but is otherwise normal. Given UA results, will send urine for culture. Discussed risks and benefits of initiating empiric antibiotic therapy. Pt is agreeable to await urine culture results prior to starting antibiotics. Vital signs are stable and she does not appears to be in any acute distress.  Plan:   -  Urinalysis, Micro If ([facility])   - Urine Culture Aerobic Bacterial   - Continue to increase oral fluid intake   - Discussed preventative measures for UTi as outlined below   - Counseled on reasons to return to clinic or seek emergency care    Follow-up: Lab results pending, will follow-up as indicated. Return to clinic if symptoms fail to improve, worsen, or new symptoms develop with the above treatment plan.      There are no discontinued medications.    Options for treatment and follow-up care were reviewed with the patient. Winnie Young  engaged in the decision making process and verbalized understanding of the options discussed and agreed with the final plan.    MAYRA Amin CNP

## 2019-06-17 NOTE — PATIENT INSTRUCTIONS
"1) Urine culture pending, I will call you on Wednesday to let you know the results and we can start antibiotics at that time if necessary.     Patient Education     Bladder Infection, Female (Adult)    Urine is normally doesn't have any bacteria in it. But bacteria can get into the urinary tract from the skin around the rectum. Or they can travel in the blood from elsewhere in the body. Once they are in your urinary tract, they can cause infection in the urethra (urethritis), the bladder (cystitis), or the kidneys (pyelonephritis).  The most common place for an infection is in the bladder. This is called a bladder infection. This is one of the most common infections in women. Most bladder infections are easily treated. They are not serious unless the infection spreads to the kidney.  The phrases \"bladder infection,\" \"UTI,\" and \"cystitis\" are often used to describe the same thing. But they are not always the same. Cystitis is an inflammation of the bladder. The most common cause of cystitis is an infection.  Symptoms  The infection causes inflammation in the urethra and bladder. This causes many of the symptoms. The most common symptoms of a bladder infection are:    Pain or burning when urinating    Having to urinate more often than usual    Urgent need to urinate    Only a small amount of urine comes out    Blood in urine    Abdominal discomfort. This is usually in the lower abdomen above the pubic bone.    Cloudy urine    Strong- or bad-smelling urine    Unable to urinate (urinary retention)    Unable to hold urine in (urinary incontinence)    Fever    Loss of appetite    Confusion (in older adults)  Causes  Bladder infections are not contagious. You can't get one from someone else, from a toilet seat, or from sharing a bath.  The most common cause of bladder infections is bacteria from the bowels. The bacteria get onto the skin around the opening of the urethra. From there, they can get into the urine and travel " up to the bladder, causing inflammation and infection. This usually happens because of:    Wiping improperly after urinating. Always wipe from front to back.    Bowel incontinence    Pregnancy    Procedures such as having a catheter inserted    Older age    Not emptying your bladder. This can allow bacteria a chance to grow in your urine.    Dehydration    Constipation    Sex    Use of a diaphragm for birth control   Treatment  Bladder infections are diagnosed by a urine test. They are treated with antibiotics and usually clear up quickly without complications. Treatment helps prevent a more serious kidney infection.  Medicines  Medicines can help in the treatment of a bladder infection:    Take antibiotics until they are used up, even if you feel better. It is important to finish them to make sure the infection has cleared.    You can use acetaminophen or ibuprofen for pain, fever, or discomfort, unless another medicine was prescribed. If you have chronic liver or kidney disease, talk with your healthcare provider before using these medicines. Also talk with your provider if you've ever had a stomach ulcer or gastrointestinal bleeding, or are taking blood-thinner medicines.    If you are given phenazopydridine to reduce burning with urination, it will cause your urine to become a bright orange color. This can stain clothing.  Care and prevention  These self-care steps can help prevent future infections:    Drink plenty of fluids to prevent dehydration and flush out your bladder. Do this unless you must restrict fluids for other health reasons, or your doctor told you not to.    Proper cleaning after going to the bathroom is important. Wipe from front to back after using the toilet to prevent the spread of bacteria.    Urinate more often. Don't try to hold urine in for a long time.    Wear loose-fitting clothes and cotton underwear. Avoid tight-fitting pants.    Improve your diet and prevent constipation. Eat more  fresh fruit and vegetables, and fiber, and less junk and fatty foods.    Avoid sex until your symptoms are gone.    Avoid caffeine, alcohol, and spicy foods. These can irritate your bladder.    Urinate right after intercourse to flush out your bladder.    If you use birth control pills and have frequent bladder infections, discuss it with your doctor.  Follow-up care  Call your healthcare provider if all symptoms are not gone after 3 days of treatment. This is especially important if you have repeat infections.  If a culture was done, you will be told if your treatment needs to be changed. If directed, you can call to find out the results.  If X-rays were done, you will be told if the results will affect your treatment.  Call 911  Call 911 if any of the following occur:    Trouble breathing    Hard to wake up or confusion    Fainting or loss of consciousness    Rapid heart rate  When to seek medical advice  Call your healthcare provider right away if any of these occur:    Fever of 100.4 F (38.0 C) or higher, or as directed by your healthcare provider    Symptoms are not better by the third day of treatment    Back or belly (abdominal) pain that gets worse    Repeated vomiting, or unable to keep medicine down    Weakness or dizziness    Vaginal discharge    Pain, redness, or swelling in the outer vaginal area (labia)  Date Last Reviewed: 10/1/2016    1033-7281 [employer]. [address] [place] [address], [place] [address]. All rights reserved. This information is not intended as a substitute for professional medical care. Always follow your healthcare professional's instructions.

## 2019-06-17 NOTE — NURSING NOTE
"19 year old  Chief Complaint   Patient presents with     Dysuria     Pt. presents to the clinic today with painful urination X 2 weeks.        Blood pressure 100/68, pulse 60, temperature 98  F (36.7  C), temperature source Oral, resp. rate 16, height 1.778 m (5' 10\"), weight 79.4 kg (175 lb), last menstrual period 06/06/2019, SpO2 99 %. Body mass index is 25.11 kg/m .  BP completed using cuff size:    There is no problem list on file for this patient.      Wt Readings from Last 2 Encounters:   06/17/19 79.4 kg (175 lb)   05/07/19 80.7 kg (178 lb)     BP Readings from Last 3 Encounters:   06/17/19 100/68   05/07/19 98/65   01/23/19 108/74       No Known Allergies    Current Outpatient Medications   Medication     norgestim-eth estrad triphasic (TRI-PREVIFEM) 0.18/0.215/0.25 MG-35 MCG tablet     No current facility-administered medications for this visit.        Social History     Tobacco Use     Smoking status: Never Smoker     Smokeless tobacco: Never Used   Substance Use Topics     Alcohol use: Yes     Alcohol/week: 2.4 oz     Types: 4 Cans of beer per week     Drug use: Yes     Types: Marijuana     Comment: once a month       Honoring Choices - Health Care Directive Guide offered to patient at time of visit.    Health Maintenance Due   Topic Date Due     PREVENTIVE CARE VISIT  1998     PAP  1998     DTAP/TDAP/TD IMMUNIZATION (1 - Tdap) 05/17/2005     HIV SCREENING  05/17/2013     HPV IMMUNIZATION (1 - Female 3-dose series) 05/17/2013         There is no immunization history on file for this patient.    No results found for: PAP      No lab results found.    PHQ-2 ( 1999 Pfizer) 5/7/2019 12/4/2018   Q1: Little interest or pleasure in doing things 0 0   Q2: Feeling down, depressed or hopeless 0 0   PHQ-2 Score 0 0       PHQ-9 SCORE 5/7/2019   PHQ-9 Total Score 1       DILLAN-7 SCORE 12/4/2018 5/7/2019   Total Score 6 2       No flowsheet data found.    Geraldine Monahan CMA  June 17, 2019 11:40 AM    "

## 2019-06-19 ENCOUNTER — TELEPHONE (OUTPATIENT)
Dept: FAMILY MEDICINE | Facility: CLINIC | Age: 19
End: 2019-06-19

## 2019-06-19 DIAGNOSIS — N30.01 ACUTE CYSTITIS WITH HEMATURIA: ICD-10-CM

## 2019-06-19 DIAGNOSIS — N30.00 ACUTE CYSTITIS WITHOUT HEMATURIA: Primary | ICD-10-CM

## 2019-06-19 LAB
BACTERIA SPEC CULT: ABNORMAL
BACTERIA SPEC CULT: ABNORMAL
Lab: ABNORMAL
SPECIMEN SOURCE: ABNORMAL

## 2019-06-19 RX ORDER — NITROFURANTOIN 25; 75 MG/1; MG/1
100 CAPSULE ORAL 2 TIMES DAILY
Qty: 14 CAPSULE | Refills: 0 | Status: SHIPPED | OUTPATIENT
Start: 2019-06-19 | End: 2019-08-05

## 2019-06-19 NOTE — TELEPHONE ENCOUNTER
Called to inform patient of urine culture results which reveals E. Coli. Recommend treatment with antibiotics. Will send prescription to patient's preferred pharmacy. Counseled on how to take this medication and on expected response to treatment. Encouraged patient to call clinic with questions or concerns. Pt is in agreement with the plan.     Theodora Barragan, EVELYN, APRN, CNP

## 2019-06-25 ENCOUNTER — OFFICE VISIT (OUTPATIENT)
Dept: DERMATOLOGY | Facility: CLINIC | Age: 19
End: 2019-06-25
Payer: COMMERCIAL

## 2019-06-25 DIAGNOSIS — L20.84 INTRINSIC ATOPIC DERMATITIS: ICD-10-CM

## 2019-06-25 DIAGNOSIS — L70.0 ACNE VULGARIS: Primary | ICD-10-CM

## 2019-06-25 DIAGNOSIS — L73.9 FOLLICULITIS: ICD-10-CM

## 2019-06-25 DIAGNOSIS — L91.8 ACROCHORDON: ICD-10-CM

## 2019-06-25 RX ORDER — CLINDAMYCIN PHOSPHATE 10 UG/ML
LOTION TOPICAL
Qty: 60 ML | Refills: 3 | Status: SHIPPED | OUTPATIENT
Start: 2019-06-25 | End: 2019-08-05

## 2019-06-25 RX ORDER — TRETINOIN 0.25 MG/G
CREAM TOPICAL
Qty: 45 G | Refills: 11 | Status: SHIPPED | OUTPATIENT
Start: 2019-06-25 | End: 2019-08-05

## 2019-06-25 ASSESSMENT — PAIN SCALES - GENERAL: PAINLEVEL: NO PAIN (0)

## 2019-06-25 NOTE — PROGRESS NOTES
[facility] Dermatology Note      Dermatology Problem List:    1. Acne vulgaris and scalp folliculitis  - TSal shampoo  - Clindamycin lotion PRN and tretinoin 0.025% QHS    Encounter Date: Jun 25, 2019    CC:   Chief Complaint   Patient presents with     Derm Problem     Winnie is here today to be seen for dry, itchy scalp and molluscum. Would like treatment options.        History of Present Illness:  Ms. Winnie Young is a 19 year old female who presents as a referral from Preeti Barragan NP. Today, the pt presents with several new complaints. First, for the past several months, she has had numerous itchy bumps on her scalp and her forehead. She notes that these spots come and go. She has not used any medications for these. The patient also notes that since Septemebr, she has had what she describes as molluscum on her thighs. She notes they sometimes become irritated, and she will develop blood blisters. She has not received any treatment for these; she does not think she is getting more of these spots. Finally, the pt notes that she has a hx of eczema. She uses an unknown moisturizer for eczema, but does not use any Rx medications. Otherwise, the pt denies any other general or skin-specific complaints at this time.      Past Medical History:   There is no problem list on file for this patient.    Past Medical History:   Diagnosis Date     Eczema      Molluscum contagiosum      No past surgical history on file.    Social History:  Patient reports that she has never smoked. She has never used smokeless tobacco. She reports that she drinks about 2.4 oz of alcohol per week. She reports that she has current or past drug history. Drug: Marijuana.    Family History:  Family History   Problem Relation Age of Onset     Diabetes Mother         Type 2     No Known Problems Father      No Known Problems Sister      No Known Problems Brother      No Known Problems Maternal Grandmother      Diabetes Maternal  Grandfather         Type 2     No Known Problems Paternal Grandmother      No Known Problems Paternal Grandfather        Medications:  Current Outpatient Medications   Medication Sig Dispense Refill     nitroFURantoin macrocrystal-monohydrate (MACROBID) 100 MG capsule Take 1 capsule (100 mg) by mouth 2 times daily for 7 days 14 capsule 0     norgestim-eth estrad triphasic (TRI-PREVIFEM) 0.18/0.215/0.25 MG-35 MCG tablet Take 1 tablet by mouth daily 84 tablet 3        No Known Allergies      Review of Systems:  -Constitutional: Otherwise feeling well today, in usual state of health.  -HEENT: Patient denies nonhealing oral sores.  -Skin: As above in HPI. No additional skin concerns.    Physical exam:  Vitals: LMP 06/06/2019 (Exact Date)   GEN: This is a well developed, well-nourished female in no acute distress, in a pleasant mood.    SKIN: examination of the scalp, face, neck, chest, abdomen, back, right and left arms, hands, fingers, right and left legs was notable for:  -There are numerous pustules and rare comedones on the forehead  -There are rare, red inflammatory papules noted on the occipital and vertex scalp  -There are four pink, pedunculated skin tags across the bilateral medial thighs  -Minimal xerosis noted on examination today  -No other lesions of concern on areas examined.      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 Given that these sometimes become irritated, I offered to freeze them today, but the pt is not interested. We will continue to monitors    2. Acne vulgaris and scalp folliculitis    TSal shampoo twice a week    Start Tretinoin 0.025% cream at bedtime to the face    Start Clindamycin lotion QDay PRN for flares    3. Atopic dermatitis    Discussed and provided a handout on gentle skin care      CC MAYRA Amin CNP  [address] [place] [id number] on close of this encounter.    Follow-up in 3 months to assess  response to tretinoin and clindamycin    Dr. Sim staffed the patient.    Staff Involved:  Resident(Radha)/Staff    Staff Physician Comments:   I saw and evaluated the patient with the resident and I agree with the assessment and plan.  I was present for the examination. I have made edits if needed.    Ambrocio Sim MD  Staff Dermatologist and Dermatopathologist  , Department of Dermatology

## 2019-06-25 NOTE — LETTER
6/25/2019       RE: Winnie Young  [address]  [place] [address]     Dear Colleague,    Thank you for referring your patient, Winnie Young, to the [facility]ERMAT[facility] at [facility]. Please see a copy of my visit note below.    [facility] Dermatology Note      Dermatology Problem List:    1. Acne vulgaris and scalp folliculitis  - TSal shampoo  - Clindamycin lotion PRN and tretinoin 0.025% QHS    Encounter Date: Jun 25, 2019    CC:   Chief Complaint   Patient presents with     Derm Problem     Winnie is here today to be seen for dry, itchy scalp and molluscum. Would like treatment options.        History of Present Illness:  Ms. Winnie Young is a 19 year old female who presents as a referral from Preeti Barragan NP. Today, the pt presents with several new complaints. First, for the past several months, she has had numerous itchy bumps on her scalp and her forehead. She notes that these spots come and go. She has not used any medications for these. The patient also notes that since Septemebr, she has had what she describes as molluscum on her thighs. She notes they sometimes become irritated, and she will develop blood blisters. She has not received any treatment for these; she does not think she is getting more of these spots. Finally, the pt notes that she has a hx of eczema. She uses an unknown moisturizer for eczema, but does not use any Rx medications. Otherwise, the pt denies any other general or skin-specific complaints at this time.      Past Medical History:   There is no problem list on file for this patient.    Past Medical History:   Diagnosis Date     Eczema      Molluscum contagiosum      No past surgical history on file.    Social History:  Patient reports that she has never smoked. She has never used smokeless tobacco. She reports that she drinks about 2.4 oz of alcohol per week. She reports that she has current or past drug  history. Drug: Marijuana.    Family History:  Family History   Problem Relation Age of Onset     Diabetes Mother         Type 2     No Known Problems Father      No Known Problems Sister      No Known Problems Brother      No Known Problems Maternal Grandmother      Diabetes Maternal Grandfather         Type 2     No Known Problems Paternal Grandmother      No Known Problems Paternal Grandfather        Medications:  Current Outpatient Medications   Medication Sig Dispense Refill     nitroFURantoin macrocrystal-monohydrate (MACROBID) 100 MG capsule Take 1 capsule (100 mg) by mouth 2 times daily for 7 days 14 capsule 0     norgestim-eth estrad triphasic (TRI-PREVIFEM) 0.18/0.215/0.25 MG-35 MCG tablet Take 1 tablet by mouth daily 84 tablet 3        No Known Allergies      Review of Systems:  -Constitutional: Otherwise feeling well today, in usual state of health.  -HEENT: Patient denies nonhealing oral sores.  -Skin: As above in HPI. No additional skin concerns.    Physical exam:  Vitals: LMP 06/06/2019 (Exact Date)   GEN: This is a well developed, well-nourished female in no acute distress, in a pleasant mood.    SKIN: examination of the scalp, face, neck, chest, abdomen, back, right and left arms, hands, fingers, right and left legs was notable for:  -There are numerous pustules and rare comedones on the forehead  -There are rare, red inflammatory papules noted on the occipital and vertex scalp  -There are four pink, pedunculated skin tags across the bilateral medial thighs  -Minimal xerosis noted on examination today  -No other lesions of concern on areas examined.      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 Given that these sometimes become irritated, I offered to freeze them today, but the pt is not interested. We will continue to monitors    2. Acne vulgaris and scalp folliculitis    TSal shampoo twice a week    Start Tretinoin 0.025% cream  at bedtime to the face    Start Clindamycin lotion QDay PRN for flares    3. Atopic dermatitis    Discussed and provided a handout on gentle skin care      CC MAYRA Amin CNP  [address] [place] [phone] on close of this encounter.    Follow-up in 3 months to assess response to tretinoin and clindamycin    Dr. Sim staffed the patient.    Staff Involved:  Resident(Radha)/Staff    Staff Physician Comments:   I saw and evaluated the patient with the resident and I agree with the assessment and plan.  I was present for the examination. I have made edits if needed.    Ambrocio Sim MD  Staff Dermatologist and Dermatopathologist  , Department of Dermatology

## 2019-06-25 NOTE — PATIENT INSTRUCTIONS
Scalp and face:    1. Two to three times a week, wash your scalp with TSal. Let this sit on your scalp for 5-10 minutes before you wash it off  2. Start tretinoin cream. Apply a green pea size amount to the face at night   -For the first month, apply every other night   -After the first month, apply every night   -This WILL make you skin red and dry, so be sure to use a thick moisturizer like Cerave or Vanicream  3. Apply clindamycin lotion up to twice a day as needed when things flare  4. For the scalp, start a medicated shampoo called TSal (you can find this at [employer]; it is made by Neutrogena). Apply this to the scalp 2-3 times per week. Be sure to leave on the scalp for 5-10 minutes before you wash it off        Eczema    1. When your skin gets dry, apply a thick moisturizer like Eucerin, Aquaphor, Vanicream, or Cerave   -If these do not work, use Cerave with pramoxine (it comes in a red tub)  2. Avoid using soap, and if you need it, only use Dove Fragrence free  3. Avoid hot showers            Dry Skin    What is dry skin?    Common skin problem    Can be worse during the winter     Affects all ages    Occurs in people with or without other skin problems    What does it look like?    Fine lines in the skin become more visible     Rough feeling skin     Flaky skin    Most common on the arms and legs    Skin can become cracked, especially on the hands and feet    What are some problems caused by dry skin?     Itching    Rubbing or scratching can cause thickened, rough skin patches    Cracks in skin can be painful    Red, itchy, scaly skin (called eczema) can occur    Yellow crusting or pus could be signs of an infection    What causes dry skin?    A lack of water in the top layer of the skin    Too much soapy water,  hot water, or harsh chemicals    Aging and sun damage    How do I treat dry skin?    Shower or bathe daily for under ten minutes with lukewarm water and mild soap.    Pat yourself dry with a towel  gently and leave your skin slightly damp.    Use moisturizing cream or ointment right away.  Avoid lotions.    What kind of mild soap should I be using?    Camay , Dove , Tone , Neutrogena , Purpose , or Oil of Olay     A non-detergent cleanser, like Cetaphil , can be used.    What should I stay away from?    Scented soaps     Bath oils    What moisturizers should I be using?    Cetaphil Cream,CeraVe Cream, Vanicream, Aquaphilic, Eucerin, Aquaphor, or Vaseline     Always apply after showering or bathing.    Reapply throughout the day, if possible.    If dry skin affects your hands, always reapply after handwashing.    What else should I know?    Using a humidifier during winter months may help.    If dry skin gets worse or if eczema develops, a steroid cream may be needed.

## 2019-06-25 NOTE — NURSING NOTE
Chief Complaint   Patient presents with     Derm Problem     Winnie is here today to be seen for dry, itchy scalp and molluscum. Would like treatment options.      Renee Cabrera LPN

## 2019-08-05 ENCOUNTER — OFFICE VISIT (OUTPATIENT)
Dept: FAMILY MEDICINE | Facility: CLINIC | Age: 19
End: 2019-08-05
Payer: COMMERCIAL

## 2019-08-05 VITALS
SYSTOLIC BLOOD PRESSURE: 105 MMHG | WEIGHT: 179.9 LBS | DIASTOLIC BLOOD PRESSURE: 70 MMHG | HEIGHT: 70 IN | HEART RATE: 70 BPM | TEMPERATURE: 98.6 F | RESPIRATION RATE: 16 BRPM | BODY MASS INDEX: 25.75 KG/M2 | OXYGEN SATURATION: 99 %

## 2019-08-05 DIAGNOSIS — Z00.00 ENCOUNTER FOR PREVENTIVE HEALTH EXAMINATION: Primary | ICD-10-CM

## 2019-08-05 DIAGNOSIS — Z12.4 ENCOUNTER FOR SCREENING FOR CERVICAL CANCER: ICD-10-CM

## 2019-08-05 DIAGNOSIS — Z30.41 ENCOUNTER FOR SURVEILLANCE OF CONTRACEPTIVE PILLS: ICD-10-CM

## 2019-08-05 LAB
CHOLEST SERPL-MCNC: 162 MG/DL
HDLC SERPL-MCNC: 68 MG/DL
LDLC SERPL CALC-MCNC: 84 MG/DL
NONHDLC SERPL-MCNC: 94 MG/DL
TRIGL SERPL-MCNC: 51 MG/DL

## 2019-08-05 RX ORDER — NORGESTIMATE AND ETHINYL ESTRADIOL 7DAYSX3 28
1 KIT ORAL DAILY
Qty: 84 TABLET | Refills: 3 | Status: SHIPPED | OUTPATIENT
Start: 2019-08-05 | End: 2020-07-10

## 2019-08-05 ASSESSMENT — ANXIETY QUESTIONNAIRES
5. BEING SO RESTLESS THAT IT IS HARD TO SIT STILL: NOT AT ALL
GAD7 TOTAL SCORE: 1
7. FEELING AFRAID AS IF SOMETHING AWFUL MIGHT HAPPEN: NOT AT ALL
6. BECOMING EASILY ANNOYED OR IRRITABLE: SEVERAL DAYS
1. FEELING NERVOUS, ANXIOUS, OR ON EDGE: NOT AT ALL
IF YOU CHECKED OFF ANY PROBLEMS ON THIS QUESTIONNAIRE, HOW DIFFICULT HAVE THESE PROBLEMS MADE IT FOR YOU TO DO YOUR WORK, TAKE CARE OF THINGS AT HOME, OR GET ALONG WITH OTHER PEOPLE: NOT DIFFICULT AT ALL
2. NOT BEING ABLE TO STOP OR CONTROL WORRYING: NOT AT ALL
3. WORRYING TOO MUCH ABOUT DIFFERENT THINGS: NOT AT ALL

## 2019-08-05 ASSESSMENT — MIFFLIN-ST. JEOR: SCORE: 1658.1

## 2019-08-05 ASSESSMENT — PATIENT HEALTH QUESTIONNAIRE - PHQ9
5. POOR APPETITE OR OVEREATING: NOT AT ALL
SUM OF ALL RESPONSES TO PHQ QUESTIONS 1-9: 1

## 2019-08-05 NOTE — NURSING NOTE
"19 year old  Chief Complaint   Patient presents with     Physical     w/Pap       Blood pressure 105/70, pulse 70, temperature 98.6  F (37  C), temperature source Oral, resp. rate 16, height 1.773 m (5' 9.8\"), weight 81.6 kg (179 lb 14.4 oz), last menstrual period 07/31/2019, SpO2 99 %. Body mass index is 25.96 kg/m .  BP completed using cuff size:    There is no problem list on file for this patient.      Wt Readings from Last 2 Encounters:   08/05/19 81.6 kg (179 lb 14.4 oz)   06/17/19 79.4 kg (175 lb)     BP Readings from Last 3 Encounters:   08/05/19 105/70   06/17/19 100/68   05/07/19 98/65       No Known Allergies    Current Outpatient Medications   Medication     norgestim-eth estrad triphasic (TRI-PREVIFEM) 0.18/0.215/0.25 MG-35 MCG tablet     clindamycin (CLEOCIN T) 1 % external lotion     tretinoin (RETIN-A) 0.025 % external cream     No current facility-administered medications for this visit.        Social History     Tobacco Use     Smoking status: Never Smoker     Smokeless tobacco: Never Used   Substance Use Topics     Alcohol use: Yes     Alcohol/week: 2.4 oz     Types: 4 Cans of beer per week     Drug use: Yes     Types: Marijuana     Comment: once a month       Honoring Choices - Health Care Directive Guide offered to patient at time of visit.    Health Maintenance Due   Topic Date Due     PAP  1998     DTAP/TDAP/TD IMMUNIZATION (2 - Td) 09/01/2010     HIV SCREENING  05/17/2013         There is no immunization history on file for this patient.    No results found for: PAP      No lab results found.    PHQ-2 ( 1999 Pfizer) 5/7/2019 12/4/2018   Q1: Little interest or pleasure in doing things 0 0   Q2: Feeling down, depressed or hopeless 0 0   PHQ-2 Score 0 0       PHQ-9 SCORE 5/7/2019 8/5/2019   PHQ-9 Total Score 1 1       DILLAN-7 SCORE 12/4/2018 5/7/2019 8/5/2019   Total Score 6 2 1       No flowsheet data found.      Sera Elias, [facility]  August 5, 2019 2:48 PM    "

## 2019-08-05 NOTE — PATIENT INSTRUCTIONS
1) Pap results and lab results will come back in 2-3 days, will release results to [facility] and I will call you if anything is abnormal.   2) Called your pharmacy and you should be getting Tri-Previfem. They are ordering this, should be ready tomorrow. Consider calling before you go to pick it up to be sure it is there for you. Call me with issues.     Preventive Health Recommendations  Female Ages 21 to 25     Yearly exam:     See your health care provider every year in order to  o Review health changes.   o Discuss preventive care.    o Review your medicines if your doctor has prescribed any.      You should be tested each year for STDs (sexually transmitted diseases).       Talk to your provider about how often you should have cholesterol testing.      Get a Pap test every three years. If you have an abnormal result, your doctor may have you test more often.      If you are at risk for diabetes, you should have a diabetes test (fasting glucose).     Shots:     Get a flu shot each year.     Get a tetanus shot every 10 years.     Consider getting the shot (vaccine) that prevents cervical cancer (Gardasil).    Nutrition:     Eat at least 5 servings of fruits and vegetables each day.    Eat whole-grain bread, whole-wheat pasta and brown rice instead of white grains and rice.    Get adequate Calcium and Vitamin D.     Lifestyle    Exercise at least 150 minutes a week each week (30 minutes a day, 5 days a week). This will help you control your weight and prevent disease.    Limit alcohol to one drink per day.    No smoking.     Wear sunscreen to prevent skin cancer.    See your dentist every six months for an exam and cleaning.

## 2019-08-05 NOTE — PROGRESS NOTES
SUBJECTIVE:   Winnie Young is an 19 year old year old woman with a past medical history significant for molluscum contagiosum and eczema who presents for preventive health visit. She has no concerns today.        Healthy Habits:    Do you get at least three servings of calcium containing foods daily (dairy, green leafy vegetables, etc.)? Yes.    Amount of exercise or daily activities, outside of work: Has historically enjoyed playing basketball and volleyball, however is not currently playing either sport. Only exercise currently is through work.    Have you had an eye exam in the past two years? Yes.    Do you see a dentist twice per year? Yes.    Do you have sleep apnea, excessive snoring or daytime drowsiness? No.    Sleep: 6-7 hours/night. Sleep disruptions: Yes, usually wakes 1 hour before she is suppose to, worries about over sleeping.    Caffeine: 2 cups/day of coffee.    Water: 6 cups/day.    Past Medical History:   Diagnosis Date     Eczema      Molluscum contagiosum      History reviewed. No pertinent surgical history.    Family History   Problem Relation Age of Onset     Impaired Fasting Glucose Mother      No Known Problems Father      No Known Problems Sister      No Known Problems Brother      No Known Problems Maternal Grandmother      Diabetes Maternal Grandfather         Type 2     Anxiety Disorder Paternal Grandmother      No Known Problems Paternal Grandfather      Social History     Socioeconomic History     Marital status: Single     Spouse name: Not on file     Number of children: 0     Years of education: 14     Highest education level: Not on file   Occupational History     Occupation: Student   Social Needs     Financial resource strain: Not on file     Food insecurity:     Worry: Not on file     Inability: Not on file     Transportation needs:     Medical: Not on file     Non-medical: Not on file   Tobacco Use     Smoking status: Never Smoker     Smokeless tobacco: Never Used   Substance  and Sexual Activity     Alcohol use: Yes     Comment: Not currently drinking.      Drug use: Yes     Types: Marijuana     Comment: Once a month     Sexual activity: Yes     Partners: Male     Birth control/protection: Pill   Lifestyle     Physical activity:     Days per week: Not on file     Minutes per session: Not on file     Stress: Not on file   Relationships     Social connections:     Talks on phone: Not on file     Gets together: Not on file     Attends Congregation service: Not on file     Active member of club or organization: Not on file     Attends meetings of clubs or organizations: Not on file     Relationship status: Not on file     Intimate partner violence:     Fear of current or ex partner: Not on file     Emotionally abused: Not on file     Physically abused: Not on file     Forced sexual activity: Not on file   Other Topics Concern     Not on file   Social History Narrative    Winnie will be starting her senior year of college at the [employer], studying communications.         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        Today's PHQ-2 Score: PHQ-2 Score:   PHQ-2 ( 1999 Pfizer) 5/7/2019 12/4/2018   Q1: Little interest or pleasure in doing things 0 0   Q2: Feeling down, depressed or hopeless 0 0   PHQ-2 Score 0 0       Today's PHQ-9 Score:   PHQ-9 SCORE 5/7/2019   PHQ-9 Total Score 1       Today's DILLAN-7 Score:   DILLAN-7 SCORE 12/4/2018 5/7/2019   Total Score 6 2       Abuse: Current or Past (Physical, Sexual or Emotional) - No  Do you feel safe in your environment - Yes    If you drink alcohol do you typically have >3 drinks per day or >7 drinks per week? No                     Reviewed orders with patient.  Reviewed health maintenance and updated orders accordingly - Yes    Mammogram not appropriate for this patient based on age. Pertinent mammograms are reviewed under the imaging tab.    History of abnormal Pap smear: NO - age  "21-29 PAP every 3 years recommended.    Reviewed and updated as needed this visit by clinical staff  Tobacco  Allergies  Meds       Reviewed and updated as needed this visit by Provider    ROS:  CONSTITUTIONAL: NEGATIVE for fever, chills, change in weight  INTEGUMENTARU/SKIN: NEGATIVE for worrisome rashes, moles or lesions  EYES: NEGATIVE for vision changes or irritation  ENT: NEGATIVE for ear, mouth and throat problems  RESP: NEGATIVE for significant cough or SOB  BREAST: NEGATIVE for masses, tenderness or discharge  CV: NEGATIVE for chest pain, palpitations or peripheral edema  GI: NEGATIVE for nausea, abdominal pain, heartburn, or change in bowel habits  : NEGATIVE for unusual urinary or vaginal symptoms. Periods are regular.  MUSCULOSKELETAL: NEGATIVE for significant arthralgias or myalgia  NEURO: NEGATIVE for weakness, dizziness or paresthesias  PSYCHIATRIC: NEGATIVE for changes in mood or affect    OBJECTIVE:   /70   Pulse 70   Temp 98.6  F (37  C) (Oral)   Resp 16   Ht 1.773 m (5' 9.8\")   Wt 81.6 kg (179 lb 14.4 oz)   LMP 07/31/2019   SpO2 99%   BMI 25.96 kg/m        EXAM:  Constitutional: appears to be in no acute distress, comfortable, pleasant.   Eyes: anicteric, conjunctiva clear without drainage or erythema. TARYN, red reflex present bilaterally.   Ears, Nose and Throat: tympanic membranes gray with LR,  nose without nasal discharge. OP: no erythema to posterior pharynx, negative post nasal drainage, tonsils +1 no erythema or exudate.  Neck: supple, thyroid palpable,not enlarged, no nodules   Breast: No nipple abnormality or discharge, no dominant masses, tenderness to palpation, axillary, supraclavicular, or infraclavicular adenopathy.  Cardiovascular: regular rate and rhythm, normal S1 and S2, no murmurs, rubs or gallops, peripheral pulses full and symmetric; negative peripheral edema   Respiratory: Air entry throughout. Breathing pattern unlabored without the use of accessory " muscles. Clear to auscultation A and P, no wheezes or crackles, normal breath sounds.    Gastrointestinal: rounded, soft. Positive bowel sounds x4, nontender, no masses.   Genitourinary: external genitalia is without lesions. Introitus is normal, vaginal walls pink and moist without lesions or evidence of trauma. There is no cervical motion tenderness and the adnexa are without masses. There is no abnormal discharge from the cervix. Cervix is pink without polyps or lesions.  Musculoskeletal: full range of motion, no edema.   Skin: pink, turgor smooth and elastic. Negative for lesions or dryness.  Neurological: normal gait, no tremor.   Psychological: appropriate mood and affect.   Lymphatic: no cervical, axillary, supraclavicular, or infraclavicular lymphadenopathy.    Laboratory testing pending:  Pap imaged thin layer screen  Lipid panel reflex to direct LDL Fasting  HIV Antigen Antibody Combo    ASSESSMENT/PLAN:     1. Encounter for preventive health examination  Comment: Age appropriate screening and preventative care have been addressed today. Patient declines routine STI screening today, last STI screen 5/2019, she denies new sexual partners since that time. Vaccinations have been reviewed. Next tetanus booster due in 2020, offered today but patient declined. Patient has been advised to follow a balanced diet with reduction in cholesterol, and reasonable portion sizes. They have been advised to undertake routine aerobic activity and they were counseled on healthy weight maintenance. Recommend annual vision exams as well as biannual dental exams. They will follow up for annual physical again in one year.   Plan:   - Lipid panel reflex to direct LDL Fasting   - HIV Antigen Antibody Combo    2. Encounter for screening for cervical cancer   Comment: Per cervical cancer screening guidelines. If normal, will repeat in 3 years, 2022.  Plan:   - Pap imaged thin layer screen only - recommended age 21 - 24 years    3.  Encounter for surveillance of contraceptive pills  Comment: Pt reports the last pill pack dispensed from the pharmacy was a different brand than Tri-Previfem and noted the development of acne on her back. She is curious if it could be related to the alternate brand of oral contraceptive. Contacted patient's pharmacy during today's appointment, they do not currently have Tri-Previfem in stock but will be ordering it for patient, should be ready for pick-up tomorrow.   Plan:   - norgestim-eth estrad triphasic (TRI-PREVIFEM) 0.18/0.215/0.25 MG-35 MCG tablet; Take 1 tablet by mouth daily  Dispense: 84 tablet; Refill: 3      Follow-up: Lab results pending, will follow-up as indicated. Return to clinic in 1 year for routine health maintenance exam.       COUNSELING:   Reviewed preventive health counseling, as reflected in patient instructions  Special attention given to:        Regular exercise       Healthy diet/nutrition       Vision screening       Immunizations       Contraception       Osteoporosis Prevention/Bone Health       Safe sex practices/STD prevention       HIV screeninx in teen years, 1x in adult years, and at intervals if high risk    Reports that she has never smoked. She has never used smokeless tobacco.    Body mass index is 25.96 kg/m .    Counseling Resources:  ATP IV Guidelines  Pooled Cohorts Equation Calculator  Breast Cancer Risk Calculator  FRAX Risk Assessment  ICSI Preventive Guidelines  Dietary Guidelines for Americans,   USDA's MyPlate  ASA Prophylaxis  Lung CA Screening    MAYRA Amin CNP on 2019 at 2:40 PM  [facility]

## 2019-08-06 LAB — HIV 1+2 AB+HIV1 P24 AG SERPL QL IA: NONREACTIVE

## 2019-08-06 ASSESSMENT — ANXIETY QUESTIONNAIRES: GAD7 TOTAL SCORE: 1

## 2019-08-08 LAB
COPATH REPORT: NORMAL
PAP: NORMAL

## 2019-09-05 ENCOUNTER — OFFICE VISIT (OUTPATIENT)
Dept: FAMILY MEDICINE | Facility: CLINIC | Age: 19
End: 2019-09-05
Payer: COMMERCIAL

## 2019-09-05 VITALS
SYSTOLIC BLOOD PRESSURE: 107 MMHG | RESPIRATION RATE: 16 BRPM | WEIGHT: 185 LBS | DIASTOLIC BLOOD PRESSURE: 67 MMHG | HEIGHT: 70 IN | BODY MASS INDEX: 26.48 KG/M2 | HEART RATE: 62 BPM | TEMPERATURE: 98.6 F | OXYGEN SATURATION: 98 %

## 2019-09-05 DIAGNOSIS — Z11.3 ROUTINE SCREENING FOR STI (SEXUALLY TRANSMITTED INFECTION): Primary | ICD-10-CM

## 2019-09-05 LAB
SPECIMEN SOURCE: NORMAL
T VAGINALIS DNA SPEC QL NAA+PROBE: NORMAL

## 2019-09-05 ASSESSMENT — ENCOUNTER SYMPTOMS
HEMATURIA: 0
DIFFICULTY URINATING: 0
FREQUENCY: 0
DYSURIA: 0

## 2019-09-05 ASSESSMENT — MIFFLIN-ST. JEOR: SCORE: 1679.64

## 2019-09-05 NOTE — NURSING NOTE
"19 year old  Chief Complaint   Patient presents with     STD     Pt. presents to the clinic today for STD testing.        Blood pressure 107/67, pulse 62, temperature 98.6  F (37  C), temperature source Oral, resp. rate 16, height 1.77 m (5' 9.7\"), weight 83.9 kg (185 lb), last menstrual period 09/02/2019, SpO2 98 %. Body mass index is 26.77 kg/m .  BP completed using cuff size:    There is no problem list on file for this patient.      Wt Readings from Last 2 Encounters:   09/05/19 83.9 kg (185 lb)   08/05/19 81.6 kg (179 lb 14.4 oz)     BP Readings from Last 3 Encounters:   09/05/19 107/67   08/05/19 105/70   06/17/19 100/68       No Known Allergies    Current Outpatient Medications   Medication     norgestim-eth estrad triphasic (TRI-PREVIFEM) 0.18/0.215/0.25 MG-35 MCG tablet     No current facility-administered medications for this visit.        Social History     Tobacco Use     Smoking status: Never Smoker     Smokeless tobacco: Never Used   Substance Use Topics     Alcohol use: Yes     Comment: Not currently drinking.      Drug use: Yes     Types: Marijuana     Comment: Once a month       Honoring Choices - Health Care Directive Guide offered to patient at time of visit.    Health Maintenance Due   Topic Date Due     DTAP/TDAP/TD IMMUNIZATION (2 - Td) 09/01/2010     INFLUENZA VACCINE (1) 09/01/2019         There is no immunization history on file for this patient.    Lab Results   Component Value Date    PAP NIL 08/05/2019         Recent Labs   Lab Test 08/05/19  1508   LDL 84   HDL 68   TRIG 51       PHQ-2 ( 1999 Pfizer) 5/7/2019 12/4/2018   Q1: Little interest or pleasure in doing things 0 0   Q2: Feeling down, depressed or hopeless 0 0   PHQ-2 Score 0 0       PHQ-9 SCORE 5/7/2019 8/5/2019   PHQ-9 Total Score 1 1       DILLAN-7 SCORE 12/4/2018 5/7/2019 8/5/2019   Total Score 6 2 1       No flowsheet data found.    Geraldine Monahan CMA  September 5, 2019 1:01 PM    "

## 2019-09-05 NOTE — PROGRESS NOTES
"       HPI       Winnie Young is a 19 year old female with a past medical history significant for eczema and molluscum contagiosum who presents for     Chief Complaint   Patient presents with     STD     Pt. presents to the clinic today for STD testing.        Winnie presents today for STI screening. She recently discovered that her current male partner \"wasn't being completely honest\" and therefore she would like updated STI screening. She denies current signs or symptoms of STIs. She has no additional concerns today.      Past Medical History:   Diagnosis Date     Eczema      Molluscum contagiosum      Current Outpatient Medications   Medication     norgestim-eth estrad triphasic (TRI-PREVIFEM) 0.18/0.215/0.25 MG-35 MCG tablet     No current facility-administered medications for this visit.       No Known Allergies      Problem, Medication and Allergy Lists were reviewed and updated if needed..    Patient is an established patient of this clinic..         Review of Systems:   Review of Systems   Genitourinary: Negative for difficulty urinating, dyspareunia, dysuria, frequency, genital sores, hematuria, pelvic pain, urgency, vaginal discharge and vaginal pain.          Physical Exam:     Vitals:    09/05/19 1300   BP: 107/67   BP Location: Left arm   Patient Position: Chair   Cuff Size: Adult Regular   Pulse: 62   Resp: 16   Temp: 98.6  F (37  C)   TempSrc: Oral   SpO2: 98%   Weight: 83.9 kg (185 lb)   Height: 1.77 m (5' 9.7\")     Body mass index is 26.77 kg/m .  Vitals were reviewed and were normal.     Physical Exam   Constitutional: She is oriented to person, place, and time. She appears well-developed and well-nourished. No distress.   Cardiovascular: Normal rate.   Pulmonary/Chest: Effort normal. No respiratory distress.   Neurological: She is alert and oriented to person, place, and time.   Psychiatric: She has a normal mood and affect. Her behavior is normal.       Results:     Laboratory testing " pending:  Trichomonas vaginalis DNA PCR  Neisseria gonorrhoea PCR  Chlamydia Trachomatis PCR  Treponema Abs w Reflex to RPR and Titer  HIV Antigen Antibody Combo  Hepatitis C Antibody  Hepatitis B Surface Antigen    Assessment and Plan      1. Routine screening for STI (sexually transmitted infection)  Comment: Pt requesting routine STI screening. She denies current s/sx.   Plan:   - Hepatitis B surface antigen   - HIV Antigen Antibody Combo   - Treponema Abs w Reflex to RPR and Titer   - Hepatitis C antibody   - NEISSERIA GONORRHOEA PCR   - CHLAMYDIA TRACHOMATIS PCR   - Trichomonas vaginalis DNA PCR    Follow-up: Lab results pending, will follow-up as results indicate.      There are no discontinued medications.    Options for treatment and follow-up care were reviewed with the patient. Winnie Young  engaged in the decision making process and verbalized understanding of the options discussed and agreed with the final plan.    MAYRA Amin CNP

## 2019-09-06 LAB
C TRACH DNA SPEC QL NAA+PROBE: NEGATIVE
HBV SURFACE AG SERPL QL IA: NONREACTIVE
HCV AB SERPL QL IA: NONREACTIVE
HIV 1+2 AB+HIV1 P24 AG SERPL QL IA: NONREACTIVE
N GONORRHOEA DNA SPEC QL NAA+PROBE: NEGATIVE
SPECIMEN SOURCE: NORMAL
SPECIMEN SOURCE: NORMAL
T PALLIDUM AB SER QL: NONREACTIVE

## 2020-03-11 ENCOUNTER — HEALTH MAINTENANCE LETTER (OUTPATIENT)
Age: 20
End: 2020-03-11

## 2020-07-06 DIAGNOSIS — Z30.41 ENCOUNTER FOR SURVEILLANCE OF CONTRACEPTIVE PILLS: ICD-10-CM

## 2020-07-09 RX ORDER — NORGESTIMATE AND ETHINYL ESTRADIOL 7DAYSX3 28
1 KIT ORAL DAILY
Qty: 28 TABLET | Refills: 0 | Status: CANCELLED | OUTPATIENT
Start: 2020-07-09

## 2020-07-09 NOTE — TELEPHONE ENCOUNTER
norgestim-eth estrad triphasic (TRI-PREVIFEM) 0.18/0.215/0.25 MG-35 MCG tablet      Last Written Prescription Date:  8/5/19  Last Fill Quantity: 84,   # refills: 3  Last Office Visit : 9/5/19  Future Office visit:  none    Routing refill request to provider for review/approval because:  Appointment due in August. NP no longer at Clinic     Scheduling has been notified to contact the pt for appointment.

## 2020-07-10 ENCOUNTER — VIRTUAL VISIT (OUTPATIENT)
Dept: FAMILY MEDICINE | Facility: CLINIC | Age: 20
End: 2020-07-10
Payer: COMMERCIAL

## 2020-07-10 DIAGNOSIS — Z30.41 ENCOUNTER FOR SURVEILLANCE OF CONTRACEPTIVE PILLS: ICD-10-CM

## 2020-07-10 RX ORDER — NORGESTIMATE AND ETHINYL ESTRADIOL 7DAYSX3 28
1 KIT ORAL DAILY
Qty: 84 TABLET | Refills: 3 | Status: SHIPPED | OUTPATIENT
Start: 2020-07-10 | End: 2021-02-26

## 2020-07-10 NOTE — PROGRESS NOTES
"Winnie Young is a 20 year old female who is being evaluated via a billable video visit.      The patient has been notified of following:     \"This video visit will be conducted via a call between you and your physician/provider. We have found that certain health care needs can be provided without the need for an in-person physical exam.  This service lets us provide the care you need with a video conversation.  If a prescription is necessary we can send it directly to your pharmacy.  If lab work is needed we can place an order for that and you can then stop by our lab to have the test done at a later time.    Video visits are billed at different rates depending on your insurance coverage.  Please reach out to your insurance provider with any questions.    If during the course of the call the physician/provider feels a video visit is not appropriate, you will not be charged for this service.\"    Patient has given verbal consent for Video visit? Yes  How would you like to obtain your AVS? Agnes  Patient would like the video invitation sent by: Agnes  Will anyone else be joining your video visit? No    Subjective     Winnie Young is a 20 year old female who presents today via video visit for the following health issues:    Winnie is requesting a refill on her birth control pills.  She has been on the same pill for this year, it is going well.  Winnie is moving to [place] and will be transferring her care there so she will have her follow up health maintenance when she moves.  No headaches, leg pain, no shortness of breath.  She takes 3 weeks of active pills and one week of the inactive and has a period.  This works well for her.    Video Start Time: 1115    Review of Systems   CONSTITUTIONAL: NEGATIVE for fever, chills, change in weight  ENT/MOUTH: NEGATIVE for ear, mouth and throat problems  RESP: NEGATIVE for significant cough or SOB  CV: NEGATIVE for chest pain, palpitations or peripheral edema      Objective     " "        Physical Exam     GENERAL: Healthy, alert and no distress  EYES: Eyes grossly normal to inspection.  No discharge or erythema, or obvious scleral/conjunctival abnormalities.  RESP: No audible wheeze, cough, or visible cyanosis.  No visible retractions or increased work of breathing.    SKIN: Visible skin clear. No significant rash, abnormal pigmentation or lesions.  NEURO: Cranial nerves grossly intact.  Mentation and speech appropriate for age.  PSYCH: Mentation appears normal, affect normal/bright, judgement and insight intact, normal speech and appearance well-groomed.        Assessment & Plan     1. Encounter for surveillance of contraceptive pills  Winnie needs the brand name of Tri-Previfem and that was sent to her pharmacy  - norgestim-eth estrad triphasic (TRI-PREVIFEM) 0.18/0.215/0.25 MG-35 MCG tablet; Take 1 tablet by mouth daily  Dispense: 84 tablet; Refill: 3     BMI:   Estimated body mass index is 26.77 kg/m  as calculated from the following:    Height as of 9/5/19: 1.77 m (5' 9.7\").    Weight as of 9/5/19: 83.9 kg (185 lb).     Winnie will establish care in [place], [place] and transfer her care and medication there within the next month.    No follow-ups on file.    Melissa Pillai NP  [facility]      Video-Visit Details    Type of service:  Video Visit    Video End Time:  61657    Originating Location (pt. Location): Home    Distant Location (provider location):  [facility]     Platform used for Video Visit: Edie    No follow-ups on file.       Melissa Pillai NP        "

## 2021-01-03 ENCOUNTER — HEALTH MAINTENANCE LETTER (OUTPATIENT)
Age: 21
End: 2021-01-03

## 2021-02-10 ENCOUNTER — OFFICE VISIT (OUTPATIENT)
Dept: FAMILY MEDICINE | Facility: CLINIC | Age: 21
End: 2021-02-10
Payer: COMMERCIAL

## 2021-02-10 VITALS
HEART RATE: 75 BPM | TEMPERATURE: 98 F | SYSTOLIC BLOOD PRESSURE: 99 MMHG | OXYGEN SATURATION: 97 % | HEIGHT: 70 IN | BODY MASS INDEX: 24.98 KG/M2 | RESPIRATION RATE: 16 BRPM | DIASTOLIC BLOOD PRESSURE: 65 MMHG | WEIGHT: 174.5 LBS

## 2021-02-10 DIAGNOSIS — S16.1XXA STRAIN OF NECK MUSCLE, INITIAL ENCOUNTER: Primary | ICD-10-CM

## 2021-02-10 ASSESSMENT — MIFFLIN-ST. JEOR: SCORE: 1627.02

## 2021-02-10 ASSESSMENT — PAIN SCALES - GENERAL: PAINLEVEL: MODERATE PAIN (5)

## 2021-02-10 NOTE — PATIENT INSTRUCTIONS
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
## 2021-02-15 ENCOUNTER — THERAPY VISIT (OUTPATIENT)
Dept: PHYSICAL THERAPY | Facility: CLINIC | Age: 21
End: 2021-02-15
Attending: NURSE PRACTITIONER
Payer: COMMERCIAL

## 2021-02-15 DIAGNOSIS — S16.1XXA STRAIN OF NECK MUSCLE, INITIAL ENCOUNTER: ICD-10-CM

## 2021-02-15 DIAGNOSIS — M54.2 CERVICAL PAIN: ICD-10-CM

## 2021-02-15 PROCEDURE — 97530 THERAPEUTIC ACTIVITIES: CPT | Mod: GP | Performed by: PHYSICAL THERAPIST

## 2021-02-15 PROCEDURE — 97161 PT EVAL LOW COMPLEX 20 MIN: CPT | Mod: GP | Performed by: PHYSICAL THERAPIST

## 2021-02-15 PROCEDURE — 97110 THERAPEUTIC EXERCISES: CPT | Mod: GP | Performed by: PHYSICAL THERAPIST
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
## 2021-02-16 NOTE — PROGRESS NOTES
[facility] Initial Evaluation  Subjective:    Patient Health History                Red flags:  Pain at rest/night.     Surgeries include:  Other ([place] teeth).        Current occupation is .   Primary job tasks include:  Computer work.                                    Objective:  System    Physical Exam    General     ROS    Assessment/Plan:

## 2021-02-22 ENCOUNTER — THERAPY VISIT (OUTPATIENT)
Dept: PHYSICAL THERAPY | Facility: CLINIC | Age: 21
End: 2021-02-22
Attending: NURSE PRACTITIONER
Payer: COMMERCIAL

## 2021-02-22 DIAGNOSIS — M54.2 CERVICAL PAIN: ICD-10-CM

## 2021-02-22 PROCEDURE — 97110 THERAPEUTIC EXERCISES: CPT | Mod: GP | Performed by: PHYSICAL THERAPIST

## 2021-02-22 PROCEDURE — 97530 THERAPEUTIC ACTIVITIES: CPT | Mod: GP | Performed by: PHYSICAL THERAPIST
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
## 2021-03-10 ENCOUNTER — THERAPY VISIT (OUTPATIENT)
Dept: PHYSICAL THERAPY | Facility: CLINIC | Age: 21
End: 2021-03-10
Payer: COMMERCIAL

## 2021-03-10 DIAGNOSIS — M54.2 CERVICAL PAIN: ICD-10-CM

## 2021-03-10 PROCEDURE — 97110 THERAPEUTIC EXERCISES: CPT | Mod: GP | Performed by: PHYSICAL THERAPIST

## 2021-03-10 PROCEDURE — 97530 THERAPEUTIC ACTIVITIES: CPT | Mod: GP | Performed by: PHYSICAL THERAPIST

## 2021-03-10 NOTE — PROGRESS NOTES
Subjective:  HPI  Physical Exam                    Objective:  System    Physical Exam    General     ROS    Assessment/Plan:    SUBJECTIVE  Subjective changes as noted by pt:   Pt. tripped and fell on 3-5 causing increased back pain. She did not injure anything but has had increased back pain and limited mobility since. It is improving day by day. Her exercise tolerance has been limited since falling. Prior to the incident she reports steady improvement.   Current pain level:  5/10   Changes in function:  Yes (See Goal flowsheet attached for changes in current functional level)     Adverse reaction to treatment or activity:  None    OBJECTIVE  Changes in objective findings:  ROM lumbar flex 75%' ext 50%. Cervical movts WNL. Strength - lower abdominals 4-/5; scap stabilizers 4-/5     ASSESSMENT  Winnie continues to require intervention to meet STG and LTG's: PT  Patient has experienced an exacerbation of symptoms.  Response to therapy has shown an improvement in  pain level, ROM  and strength  Progress made towards STG/LTG?  Yes (See Goal flowsheet attached for updates on achievement of STG and LTG)    PLAN  Current treatment program is being advanced to more complex exercises.    PTA/ATC plan:  N/A    Please refer to the daily flowsheet for treatment today, total treatment time and time spent performing 1:1 timed codes.

## 2021-03-24 ENCOUNTER — THERAPY VISIT (OUTPATIENT)
Dept: PHYSICAL THERAPY | Facility: CLINIC | Age: 21
End: 2021-03-24
Payer: COMMERCIAL

## 2021-03-24 DIAGNOSIS — M54.2 CERVICAL PAIN: ICD-10-CM

## 2021-03-24 PROCEDURE — 97110 THERAPEUTIC EXERCISES: CPT | Mod: GP | Performed by: PHYSICAL THERAPIST

## 2021-03-24 PROCEDURE — 97530 THERAPEUTIC ACTIVITIES: CPT | Mod: GP | Performed by: PHYSICAL THERAPIST

## 2021-03-24 NOTE — PROGRESS NOTES
Subjective:  HPI  Physical Exam                    Objective:  System    Physical Exam    General     ROS    Assessment/Plan:    SUBJECTIVE  Subjective changes as noted by pt:   Pt. is feeling much better than last visit after falling. She feels she has recovered fully from the fall and has continued to progress foward in her progress.     Current pain level:  4/10   Changes in function:  Yes (See Goal flowsheet attached for changes in current functional level)     Adverse reaction to treatment or activity:  None    OBJECTIVE  Changes in objective findings:  ROM lumbar movts WNL. Strength - lower abdominals 4/5; extensors 4/5; scap stabilizers 4/5     ASSESSMENT  Winnie continues to require intervention to meet STG and LTG's: PT  Patient's symptoms are resolving.  Response to therapy has shown an improvement in  pain level, ROM  and strength  Progress made towards STG/LTG?  Yes (See Goal flowsheet attached for updates on achievement of STG and LTG)    PLAN  Current treatment program is being advanced to more complex exercises.    PTA/ATC plan:  N/A    Please refer to the daily flowsheet for treatment today, total treatment time and time spent performing 1:1 timed codes.

## 2021-04-19 ENCOUNTER — THERAPY VISIT (OUTPATIENT)
Dept: PHYSICAL THERAPY | Facility: CLINIC | Age: 21
End: 2021-04-19
Payer: COMMERCIAL

## 2021-04-19 DIAGNOSIS — M54.2 CERVICAL PAIN: ICD-10-CM

## 2021-04-19 PROCEDURE — 97530 THERAPEUTIC ACTIVITIES: CPT | Mod: GP | Performed by: PHYSICAL THERAPIST

## 2021-04-19 PROCEDURE — 97110 THERAPEUTIC EXERCISES: CPT | Mod: GP | Performed by: PHYSICAL THERAPIST

## 2021-04-20 NOTE — PROGRESS NOTES
Subjective:  HPI  Physical Exam                    Objective:  System    Physical Exam    General     ROS    Assessment/Plan:    PROGRESS  REPORT    Progress reporting period is from 2-15-21 to 4-19-21.       SUBJECTIVE  Subjective changes noted by patient:   Pt. has made moderate progress thus far in PT with gradual decreasing upper/lower back pain and improving strength. Pt. now reports improved tolerance to prolonged sitting/computer work at her job. She also has gradually been able to increase her activity level at home. She plans to resume her gym membership sometime next month.      Current pain level is 3/10.     Previous pain level was 5/10.   Changes in function:  Yes (See Goal flowsheet attached for changes in current functional level)  Adverse reaction to treatment or activity: None    OBJECTIVE  Changes noted in objective findings: Strength lower abdominals 4+/5; lumbar extensors 4+/5; B scap stabilizers 4+/5     ASSESSMENT/PLAN  Updated problem list and treatment plan: Diagnosis 1:  Upper back pain  Pain -  self management and education  Decreased strength - therapeutic exercise and therapeutic activities  Impaired muscle performance - neuro re-education  Decreased function - therapeutic activities  STG/LTGs have been met or progress has been made towards goals:  Yes (See Goal flow sheet completed today.)  Assessment of Progress: The patient's condition is improving.  Self Management Plans:  Patient has been instructed in a home treatment program.  Patient  has been instructed in self management of symptoms.  I have re-evaluated this patient and find that the nature, scope, duration and intensity of the therapy is appropriate for the medical condition of the patient.  Winnie continues to require the following intervention to meet STG and LTG's:  PT    Recommendations:  This patient would benefit from continued therapy.     Frequency:  1 X a month, once daily  Duration:  for 1 month        Please refer to  the daily flowsheet for treatment today, total treatment time and time spent performing 1:1 timed codes.

## 2021-10-10 ENCOUNTER — HEALTH MAINTENANCE LETTER (OUTPATIENT)
Age: 21
End: 2021-10-10

## 2021-11-18 PROBLEM — M54.2 CERVICAL PAIN: Status: RESOLVED | Noted: 2021-02-15 | Resolved: 2021-11-18

## 2022-01-29 ENCOUNTER — HEALTH MAINTENANCE LETTER (OUTPATIENT)
Age: 22
End: 2022-01-29

## 2022-09-18 ENCOUNTER — HEALTH MAINTENANCE LETTER (OUTPATIENT)
Age: 22
End: 2022-09-18

## 2022-11-07 ENCOUNTER — OFFICE VISIT (OUTPATIENT)
Dept: FAMILY MEDICINE | Facility: CLINIC | Age: 22
End: 2022-11-07
Payer: COMMERCIAL

## 2022-11-07 VITALS
SYSTOLIC BLOOD PRESSURE: 129 MMHG | HEIGHT: 71 IN | BODY MASS INDEX: 24.5 KG/M2 | WEIGHT: 175 LBS | TEMPERATURE: 98.4 F | OXYGEN SATURATION: 98 % | DIASTOLIC BLOOD PRESSURE: 90 MMHG | HEART RATE: 69 BPM

## 2022-11-07 DIAGNOSIS — N92.4 EXCESSIVE BLEEDING IN PREMENOPAUSAL PERIOD: ICD-10-CM

## 2022-11-07 DIAGNOSIS — Z20.2 POTENTIAL EXPOSURE TO STD: ICD-10-CM

## 2022-11-07 DIAGNOSIS — B96.89 BV (BACTERIAL VAGINOSIS): ICD-10-CM

## 2022-11-07 DIAGNOSIS — Z00.00 ROUTINE HISTORY AND PHYSICAL EXAMINATION OF ADULT: Primary | ICD-10-CM

## 2022-11-07 DIAGNOSIS — N76.0 BV (BACTERIAL VAGINOSIS): ICD-10-CM

## 2022-11-07 LAB
ALBUMIN SERPL BCG-MCNC: 4.5 G/DL (ref 3.5–5.2)
ALP SERPL-CCNC: 39 U/L (ref 35–129)
ALT SERPL W P-5'-P-CCNC: 15 U/L (ref 10–50)
ANION GAP SERPL CALCULATED.3IONS-SCNC: 12 MMOL/L (ref 7–15)
AST SERPL W P-5'-P-CCNC: 26 U/L (ref 10–50)
BASOPHILS # BLD AUTO: 0 10E3/UL (ref 0–0.2)
BASOPHILS NFR BLD AUTO: 1 %
BILIRUB SERPL-MCNC: 1.1 MG/DL
BUN SERPL-MCNC: 8.4 MG/DL (ref 6–20)
CALCIUM SERPL-MCNC: 9 MG/DL (ref 8.6–10)
CHLORIDE SERPL-SCNC: 105 MMOL/L (ref 98–107)
CHOLEST SERPL-MCNC: 143 MG/DL
CREAT SERPL-MCNC: 0.71 MG/DL (ref 0.51–1.17)
DEPRECATED HCO3 PLAS-SCNC: 23 MMOL/L (ref 22–29)
EOSINOPHIL # BLD AUTO: 0.3 10E3/UL (ref 0–0.7)
EOSINOPHIL NFR BLD AUTO: 5 %
ERYTHROCYTE [DISTWIDTH] IN BLOOD BY AUTOMATED COUNT: 12.8 % (ref 10–15)
FERRITIN SERPL-MCNC: 69 NG/ML (ref 6–409)
GFR SERPL CREATININE-BSD FRML MDRD: >90 ML/MIN/1.73M2
GLUCOSE SERPL-MCNC: 91 MG/DL (ref 70–99)
HCT VFR BLD AUTO: 41.4 % (ref 35–53)
HDLC SERPL-MCNC: 60 MG/DL
HGB BLD-MCNC: 13.5 G/DL (ref 11.7–17.7)
IMM GRANULOCYTES # BLD: 0 10E3/UL
IMM GRANULOCYTES NFR BLD: 0 %
IRON BINDING CAPACITY (ROCHE): 295 UG/DL (ref 240–430)
IRON SATN MFR SERPL: 23 % (ref 15–46)
IRON SERPL-MCNC: 67 UG/DL (ref 37–157)
LDLC SERPL CALC-MCNC: 62 MG/DL
LYMPHOCYTES # BLD AUTO: 1.5 10E3/UL (ref 0.8–5.3)
LYMPHOCYTES NFR BLD AUTO: 28 %
MCH RBC QN AUTO: 28.9 PG (ref 26.5–33)
MCHC RBC AUTO-ENTMCNC: 32.6 G/DL (ref 31.5–36.5)
MCV RBC AUTO: 89 FL (ref 78–100)
MONOCYTES # BLD AUTO: 0.5 10E3/UL (ref 0–1.3)
MONOCYTES NFR BLD AUTO: 8 %
NEUTROPHILS # BLD AUTO: 3.2 10E3/UL (ref 1.6–8.3)
NEUTROPHILS NFR BLD AUTO: 58 %
NONHDLC SERPL-MCNC: 83 MG/DL
NRBC # BLD AUTO: 0 10E3/UL
NRBC BLD AUTO-RTO: 0 /100
PLATELET # BLD AUTO: 221 10E3/UL (ref 150–450)
POTASSIUM SERPL-SCNC: 4.4 MMOL/L (ref 3.4–5.3)
PROT SERPL-MCNC: 7.2 G/DL (ref 6.4–8.3)
RBC # BLD AUTO: 4.67 10E6/UL (ref 3.8–5.9)
SODIUM SERPL-SCNC: 140 MMOL/L (ref 136–145)
TRIGL SERPL-MCNC: 104 MG/DL
WBC # BLD AUTO: 5.4 10E3/UL (ref 4–11)

## 2022-11-07 PROCEDURE — 85025 COMPLETE CBC W/AUTO DIFF WBC: CPT | Performed by: NURSE PRACTITIONER

## 2022-11-07 PROCEDURE — 82728 ASSAY OF FERRITIN: CPT | Performed by: NURSE PRACTITIONER

## 2022-11-07 PROCEDURE — 80061 LIPID PANEL: CPT | Performed by: NURSE PRACTITIONER

## 2022-11-07 PROCEDURE — 87491 CHLMYD TRACH DNA AMP PROBE: CPT | Performed by: NURSE PRACTITIONER

## 2022-11-07 PROCEDURE — G0145 SCR C/V CYTO,THINLAYER,RESCR: HCPCS | Performed by: NURSE PRACTITIONER

## 2022-11-07 PROCEDURE — 87591 N.GONORRHOEAE DNA AMP PROB: CPT | Performed by: NURSE PRACTITIONER

## 2022-11-07 PROCEDURE — 83550 IRON BINDING TEST: CPT | Performed by: NURSE PRACTITIONER

## 2022-11-07 PROCEDURE — 80053 COMPREHEN METABOLIC PANEL: CPT | Performed by: NURSE PRACTITIONER

## 2022-11-07 RX ORDER — METRONIDAZOLE 500 MG/1
500 TABLET ORAL 2 TIMES DAILY
Qty: 14 TABLET | Refills: 0 | Status: SHIPPED | OUTPATIENT
Start: 2022-11-07

## 2022-11-07 ASSESSMENT — ANXIETY QUESTIONNAIRES
GAD7 TOTAL SCORE: 2
7. FEELING AFRAID AS IF SOMETHING AWFUL MIGHT HAPPEN: NOT AT ALL
3. WORRYING TOO MUCH ABOUT DIFFERENT THINGS: NOT AT ALL
GAD7 TOTAL SCORE: 2
1. FEELING NERVOUS, ANXIOUS, OR ON EDGE: NOT AT ALL
5. BEING SO RESTLESS THAT IT IS HARD TO SIT STILL: NOT AT ALL
2. NOT BEING ABLE TO STOP OR CONTROL WORRYING: NOT AT ALL
IF YOU CHECKED OFF ANY PROBLEMS ON THIS QUESTIONNAIRE, HOW DIFFICULT HAVE THESE PROBLEMS MADE IT FOR YOU TO DO YOUR WORK, TAKE CARE OF THINGS AT HOME, OR GET ALONG WITH OTHER PEOPLE: NOT DIFFICULT AT ALL
6. BECOMING EASILY ANNOYED OR IRRITABLE: SEVERAL DAYS

## 2022-11-07 ASSESSMENT — ENCOUNTER SYMPTOMS
CONSTIPATION: 0
NERVOUS/ANXIOUS: 0
FEVER: 0
PALPITATIONS: 0
WEAKNESS: 0
COUGH: 0
BREAST MASS: 0
HEMATOCHEZIA: 0
PARESTHESIAS: 0
HEMATURIA: 0
SORE THROAT: 0
HEARTBURN: 0
DIARRHEA: 0
NAUSEA: 0
EYE PAIN: 0
MYALGIAS: 0
FREQUENCY: 0
HEADACHES: 0
JOINT SWELLING: 0
SHORTNESS OF BREATH: 0
ABDOMINAL PAIN: 0
DYSURIA: 0
DIZZINESS: 0
ARTHRALGIAS: 0
CHILLS: 0

## 2022-11-07 ASSESSMENT — PATIENT HEALTH QUESTIONNAIRE - PHQ9
SUM OF ALL RESPONSES TO PHQ QUESTIONS 1-9: 1
5. POOR APPETITE OR OVEREATING: SEVERAL DAYS

## 2022-11-07 NOTE — NURSING NOTE
"ROOM:3  ADAM GLASGOW    Preferred Name: Souleymane LEDESMA AGREED:          Flu     DECLINED:              22 year old  Chief Complaint   Patient presents with     Physical     With Pap     STD     Testing        Blood pressure (!) 129/90, pulse 69, temperature 98.4  F (36.9  C), temperature source Oral, height 1.803 m (5' 11\"), weight 79.4 kg (175 lb), last menstrual period 10/12/2022, SpO2 98 %. Body mass index is 24.41 kg/m .  BP completed using cuff size:        Patient Active Problem List   Diagnosis   (none) - all problems resolved or deleted       Wt Readings from Last 2 Encounters:   11/07/22 79.4 kg (175 lb)   02/10/21 79.2 kg (174 lb 8 oz)     BP Readings from Last 3 Encounters:   11/07/22 (!) 129/90   02/10/21 99/65   09/05/19 107/67       No Known Allergies    No current outpatient medications on file.     No current facility-administered medications for this visit.       Social History     Tobacco Use     Smoking status: Never     Smokeless tobacco: Never   Substance Use Topics     Alcohol use: Yes     Comment: 3-4/week     Drug use: Yes     Types: Marijuana     Comment: 1-2 times/week       Honoring Choices - Health Care Directive Guide offered to patient at time of visit.    Health Maintenance Due   Topic Date Due     CHLAMYDIA SCREENING  Never done     ADVANCE CARE PLANNING  Never done     YEARLY PREVENTIVE VISIT  08/05/2020     COVID-19 Vaccine (4 - Booster for Pfizer series) 01/14/2022     PAP  08/05/2022     INFLUENZA VACCINE (1) 09/01/2022         There is no immunization history on file for this patient.    Lab Results   Component Value Date    PAP NIL 08/05/2019       Recent Labs   Lab Test 08/05/19  1508   LDL 84   HDL 68   TRIG 51       PHQ-2 ( 1999 Pfizer) 11/7/2022 2/10/2021   Q1: Little interest or pleasure in doing things 0 0   Q2: Feeling down, depressed or hopeless 0 0   PHQ-2 Score 0 0   PHQ-2 Total Score (12-17 Years)- Positive if 3 or more points; Administer PHQ-A if positive - 0   Q1: " Little interest or pleasure in doing things Not at all -   Q2: Feeling down, depressed or hopeless Not at all -   PHQ-2 Score 0 -       PHQ-9 SCORE 5/7/2019 8/5/2019 11/7/2022   PHQ-9 Total Score 1 1 1       DILLAN-7 SCORE 5/7/2019 8/5/2019 11/7/2022   Total Score 2 1 2       No flowsheet data found.    Martin Palomares    November 7, 2022 1:56 PM

## 2022-11-07 NOTE — PROGRESS NOTES
"Physical Note    Concerns today: yearly physical, heavier periods, BV sx, STD testing.     1. Patient is 22 years old, presenting for an annual physical, with questions regarding short duration of periods, BV testing, and routine STD testing. Lives alone in an apartment, with family support consisting of a sibling and their fiance, who live in the same building. States has been experiencing a lot of change and associated stress with those changes. Works at a bar. Manages stress by doing daily yoga, attending therapy, and meditation. Has deleted social media accounts, which has helped with anxiety. Planning to move out of [facility] when current apartment lease is up, but unsure where. Takes daily supplements including a multivitamin, cranberry supplement, a probiotic, fish oil, and supplements called \"muscle ease\" and \"calm\" occasionally for anxiety. Smokes a hookah and marijuana socially. Follows mostly a vegetarian diet, eats some chicken but no red meat.     2. Patient c/o \"short\" period cycles, average of 3 weeks between periods, and had one twice in once month. C/o heavier periods with cramping abdominal pain, and takes ibuprofen to help manage pain. Patient has been off birth control for two years, and does not have sex with men. Pt identifies closest to being bisexual, is not currently sexually active, and had 1 female partner earlier this year.     3. Pt c/o symptoms consistent with those of BV that she had in the past. States discharge is \"different\" from normal discharge, unable to describe quality. Also c/o different odor, somewhat of a fishy smell.     4. Regarding testing for STD, pt has no concerning symptoms but would like testing to ensure, as pt has had 2-3 partners since last being tested.     ROS:  CONSTITUTIONAL: no fatigue, no unexpected change in weight  SKIN: no worrisome rashes, no worrisome moles, no worrisome lesions  EYES: no acute vision problems or changes  ENT: no ear problems, no mouth " "problems, no throat problems  RESP: no significant cough, no shortness of breath  BREAST: no masses, no tenderness, no discharge  CV: no chest pain, no palpitations, no new or worsening peripheral edema  GI: no nausea, no vomiting, no constipation, no diarrhea  : no frequency, no dysuria, no hematuria; c/o heavier periods, more frequent; abnormal vaginal odor - \"different\" per pt  MS: no claudication, no myalgias, no joint aches  NEURO: no weakness, no dizziness, no syncope, no headaches  ENDOCRINE: no temperature intolerance, no skin/hair changes  HEME: no easy bruising, no bleeding problems  PSYCHIATRIC: NEGATIVE for changes in mood or affect    Sexually Active: No  Sexual concerns: No   Contraception:Details: none; no male sexual partners   P: 0  Menarche: Patient's last menstrual period was 10/12/2022. q 3 weeks  STD History: Neg  Last Pap Smear Date: 2019 normal  Abnormal Pap History: None    Patient Active Problem List   Diagnosis   (none) - all problems resolved or deleted       No current outpatient medications on file.       Past Medical History:   Diagnosis Date     Eczema      Molluscum contagiosum         Family History     Problem (# of Occurrences) Relation (Name,Age of Onset)    Anxiety Disorder (1) Paternal Grandmother    Impaired Fasting Glucose (1) Mother    Diabetes (1) Maternal Grandfather: Type 2    No Known Problems (5) Father, Sister, Brother, Maternal Grandmother, Paternal Grandfather          Problem List Medication List and Allergy List were reviewed.    Patient is an established patient of this clinic..    Social History     Tobacco Use     Smoking status: Never     Smokeless tobacco: Never   Substance Use Topics     Alcohol use: Yes     Comment: 3-4/week     Single  Children ? no    Has anyone hurt you physically, for example by pushing, hitting, slapping or kicking you or forcing you to have sex? Denies  Do you feel threatened or controlled by a partner, ex-partner or anyone " "in your life? Denies    RISK BEHAVIORS AND HEALTHY HABITS:  Tobacco Use/Smoking: Occasional Hookah/marijuanna use   Illicit Drug Use: None  Do you use alcohol? Yes; 1 beer/week  Diet (5-7 servings of fruits/veg daily): Yes   Exercise (30 min accumulated most days):yoga most days of the week  Dental Care: Yes   Calcium 1500 mg/d:  Yes  Seat Belt Use: Yes     Pap every 3 years for women 21-29. Recommended and patient accepted testing. and HIV screening:  Recommended and patient accepted testing.  Breast CA Screening (>39 yo or 10 y before 1st degree relative diagnosis): Testing not indicated   CV Risk based on Pooled Cohort Risk: 0.3%   Pooled Cohort Risk Calculator    There is no immunization history for the selected administration types on file for this patient. FOR WOMEN 9y-26y - Gardisil up to date.     EXAMINATION:   BP (!) 129/90   Pulse 69   Temp 98.4  F (36.9  C) (Oral)   Ht 1.803 m (5' 11\")   Wt 79.4 kg (175 lb)   LMP 10/12/2022   SpO2 98%   BMI 24.41 kg/m    GENERAL: healthy, alert and no distress  EYES: Eyes grossly normal to inspection, extraocular movements - intact, and PERRL  HENT: ear canals- normal; TMs- normal; Nose- normal; Mouth- no ulcers, no lesions  NECK: no tenderness, no adenopathy, no asymmetry, no masses, no stiffness; thyroid- normal to palpation  RESP: lungs clear to auscultation - no rales, no rhonchi, no wheezes  BREAST: no masses, no tenderness, no nipple discharge, no palpable axillary masses or adenopathy  CV: regular rates and rhythm, normal S1 S2, no S3 or S4 and no murmur, no click or rub -  ABDOMEN: soft, no tenderness, no  hepatosplenomegaly, no masses, normal bowel sounds  MS: extremities- no gross deformities noted, no edema  SKIN: skin tag right inner thigh  NEURO: strength and tone- normal, sensory exam- grossly normal, mentation- intact, speech- normal, reflexes- symmetric  BACK: no CVA tenderness, no paralumbar tenderness  - female: cervix- normal, adnexae- normal; " uterus- normal, no masses, no discharge; fishy odor present  RECTAL- female: no masses, no hemorrhoids  PSYCH: Alert and oriented times 3; speech- coherent , normal rate and volume; able to articulate logical thoughts, able to abstract reason, no tangential thoughts, no hallucinations or delusions, affect- normal  LYMPHATICS: ant. cervical- normal, post. cervical- normal, axillary- normal, supraclavicular- normal, inguinal- normal    ASSESSMENT:  1. Health Care Maintenance: Normal Physical Exam    2. 3 week long periods and heavier bleeding.    3. BV symptoms.    4. STD testing.     PLAN:  1. Labs obtained. Routine follow up in one year. Pap performed today.    2. Heavier periods with a 3 week cycle. Will check CBC and ferritin.     3. BV symptoms. Pt will self swab for BV and . Discussed treatment with metronidazole and pt accepting of treatment. Educated on no alcohol use while on medication, and pt verbalized understanding.     4. STD testing. Pt performed self swab for testing.     Lanny Allen, DNP student

## 2022-11-08 LAB
C TRACH DNA SPEC QL NAA+PROBE: NEGATIVE
N GONORRHOEA DNA SPEC QL NAA+PROBE: NEGATIVE

## 2022-11-09 LAB
BKR LAB AP GYN ADEQUACY: NORMAL
BKR LAB AP GYN INTERPRETATION: NORMAL
BKR LAB AP HPV REFLEX: NO
BKR LAB AP LMP: NORMAL
BKR LAB AP PREVIOUS ABNORMAL: NORMAL
PATH REPORT.COMMENTS IMP SPEC: NORMAL
PATH REPORT.COMMENTS IMP SPEC: NORMAL
PATH REPORT.RELEVANT HX SPEC: NORMAL

## 2023-12-17 ENCOUNTER — HEALTH MAINTENANCE LETTER (OUTPATIENT)
Age: 23
End: 2023-12-17

## 2025-01-12 ENCOUNTER — HEALTH MAINTENANCE LETTER (OUTPATIENT)
Age: 25
End: 2025-01-12